# Patient Record
Sex: MALE | Race: WHITE | NOT HISPANIC OR LATINO | URBAN - METROPOLITAN AREA
[De-identification: names, ages, dates, MRNs, and addresses within clinical notes are randomized per-mention and may not be internally consistent; named-entity substitution may affect disease eponyms.]

---

## 2024-11-15 ENCOUNTER — TELEPHONE (OUTPATIENT)
Dept: NEUROSURGERY | Facility: CLINIC | Age: 59
End: 2024-11-15
Payer: COMMERCIAL

## 2024-12-12 ENCOUNTER — OFFICE VISIT (OUTPATIENT)
Dept: NEUROSURGERY | Facility: HOSPITAL | Age: 59
End: 2024-12-12
Payer: COMMERCIAL

## 2024-12-12 VITALS
BODY MASS INDEX: 31.44 KG/M2 | HEART RATE: 53 BPM | WEIGHT: 245 LBS | RESPIRATION RATE: 18 BRPM | SYSTOLIC BLOOD PRESSURE: 131 MMHG | DIASTOLIC BLOOD PRESSURE: 70 MMHG | HEIGHT: 74 IN

## 2024-12-12 DIAGNOSIS — M48.07 FORAMINAL STENOSIS OF LUMBOSACRAL REGION: ICD-10-CM

## 2024-12-12 DIAGNOSIS — M79.605 LOW BACK PAIN RADIATING TO LEFT LOWER EXTREMITY: Primary | ICD-10-CM

## 2024-12-12 DIAGNOSIS — M54.50 LOW BACK PAIN RADIATING TO LEFT LOWER EXTREMITY: Primary | ICD-10-CM

## 2024-12-12 PROCEDURE — 99215 OFFICE O/P EST HI 40 MIN: CPT | Performed by: STUDENT IN AN ORGANIZED HEALTH CARE EDUCATION/TRAINING PROGRAM

## 2024-12-12 PROCEDURE — 99205 OFFICE O/P NEW HI 60 MIN: CPT | Performed by: STUDENT IN AN ORGANIZED HEALTH CARE EDUCATION/TRAINING PROGRAM

## 2024-12-12 PROCEDURE — 3008F BODY MASS INDEX DOCD: CPT | Performed by: STUDENT IN AN ORGANIZED HEALTH CARE EDUCATION/TRAINING PROGRAM

## 2024-12-12 RX ORDER — ATORVASTATIN CALCIUM 20 MG/1
0.5 TABLET, FILM COATED ORAL
COMMUNITY
Start: 2024-04-30

## 2024-12-12 RX ORDER — METOPROLOL TARTRATE 50 MG/1
50 TABLET ORAL 2 TIMES DAILY
COMMUNITY
Start: 2024-10-28

## 2024-12-12 RX ORDER — LEVOTHYROXINE SODIUM 88 UG/1
88 TABLET ORAL
COMMUNITY
Start: 2024-06-11 | End: 2025-03-08

## 2024-12-12 RX ORDER — ASPIRIN 81 MG/1
81 TABLET ORAL DAILY
COMMUNITY

## 2024-12-12 RX ORDER — GABAPENTIN 300 MG/1
300 CAPSULE ORAL 2 TIMES DAILY
COMMUNITY

## 2024-12-12 RX ORDER — LANOLIN ALCOHOL/MO/W.PET/CERES
400 CREAM (GRAM) TOPICAL
COMMUNITY
Start: 2023-01-09

## 2024-12-12 RX ORDER — BACLOFEN 5 MG/1
1 TABLET ORAL DAILY PRN
COMMUNITY
Start: 2024-12-04 | End: 2025-06-02

## 2024-12-12 RX ORDER — DILTIAZEM HYDROCHLORIDE 240 MG/1
240 CAPSULE, COATED, EXTENDED RELEASE ORAL
COMMUNITY
Start: 2024-05-09 | End: 2025-05-04

## 2024-12-12 RX ORDER — MELOXICAM 15 MG/1
15 TABLET ORAL
COMMUNITY
Start: 2023-12-01

## 2024-12-12 RX ORDER — POLYETHYLENE GLYCOL 3350 17 G/17G
17 POWDER, FOR SOLUTION ORAL
COMMUNITY

## 2024-12-12 RX ORDER — PROMETHAZINE HYDROCHLORIDE 25 MG/1
25 TABLET ORAL AS NEEDED
COMMUNITY
Start: 2023-04-26

## 2024-12-12 ASSESSMENT — PAIN SCALES - GENERAL: PAINLEVEL_OUTOF10: 7

## 2024-12-12 NOTE — PROGRESS NOTES
[facility] [place]  Department of Neurological Surgery  New Patient Visit    History of Present Illness:  Wilfred Lubin is a 59 y.o. year old male who presents to the spine clinic with lower back and left leg radiculopathy.      Wilfred has had 10 years of debilitating back pain following a trauma. He has been seeing pain management for many years with many injections. He has had multiple surgeries on his left foot ultimately resulting in a fused ankle. He had a LEFT hip replacement in 5/2022 (Jimi, [facility]). Pain is worse with sitting, but also with standing and improved with walking. Pain is not improved with leaning forward. He endorses lower back pain that radiates into his buttock and posterior thigh into his whole leg.     Whole distal leg He is taking gabapentin: 600-300-900. He has trialed baclofen in the past.  Recently started on tramadol by his pain team. Last injection was Left TFESI L4-5, L5-S1 on 9/9/24 (Kathy, [facility]). An injection in 5/2024 brought 2 months of relief and previously would last 8-9 months. He stated that the injection does better when the procedure is more painful. He has an abnormal gait and forced to walk more sideways.     He has been followed by Dr. Ash ([facility]). He has also seen peripheral nerve surgeon Dr. Terry ([facility]) who felt there was no concern for neuropathy.  He has had 2 EMGs.      PMH: CAD s/p PTCA, paroxysmal A-fib, dyslipidemia, hypothyroidism, and HTN   ASA81    Prior Spine Surgeries: no , Left THR    Physical Therapy: mutliple times,  last few months ago   Diabetic: no    Patient's BMI is Body mass index is 31.46 kg/m².    14/14 systems reviewed and negative other than what is listed in the history of present illness    There is no problem list on file for this patient.    No past medical history on file.  No past surgical history on file.  Social History     Tobacco Use    Smoking status: Never     Passive exposure: Never    Smokeless tobacco: Never    Substance Use Topics    Alcohol use: Not Currently     family history is not on file.    Current Outpatient Medications:     aspirin 81 mg EC tablet, Take 1 tablet (81 mg) by mouth once daily., Disp: , Rfl:     atorvastatin (Lipitor) 20 mg tablet, Take 0.5 tablets (10 mg) by mouth early in the morning.., Disp: , Rfl:     baclofen (Lioresal) 5 mg tablet, Take 1 tablet (5 mg) by mouth once daily as needed., Disp: , Rfl:     dilTIAZem CD (Cardizem CD) 240 mg 24 hr capsule, Take 1 capsule (240 mg) by mouth once daily., Disp: , Rfl:     gabapentin (Neurontin) 300 mg capsule, Take 1 capsule (300 mg) by mouth 2 times a day., Disp: , Rfl:     levothyroxine (Synthroid, Levoxyl) 88 mcg tablet, Take 1 tablet (88 mcg) by mouth once daily., Disp: , Rfl:     magnesium oxide (Mag-Ox) 400 mg (241.3 mg magnesium) tablet, Take 1 tablet (400 mg) by mouth once daily., Disp: , Rfl:     meloxicam (Mobic) 15 mg tablet, Take 1 tablet (15 mg) by mouth once daily., Disp: , Rfl:     metoprolol tartrate (Lopressor) 50 mg tablet, Take 1 tablet by mouth twice a day., Disp: , Rfl:     polyethylene glycol (Glycolax, Miralax) 17 gram/dose powder, Mix 17 g of powder and drink once daily., Disp: , Rfl:     promethazine (Phenergan) 25 mg tablet, Take 1 tablet (25 mg) by mouth if needed for nausea., Disp: , Rfl:   No Known Allergies    Physical Examination    General: Well developed, awake/alert/oriented x3, no distress, alert and cooperative  Skin: Warm and dry, no lesions, no rashes  ENMT: Mucous membranes moist, no apparent injury, no lesions seen  Head/Neck: Neck Supple, no apparent injury  Respiratory/Thorax: Normal breath sounds with good chest expansion, thorax symmetric  Cardiovascular: No pitting edema, no JVD    Motor Strength: 5/5 Throughout all extremities   Left ankle fusion     Muscle Bulk: Normal and symmetric in all extremities    Sensation: intact to light touch    Results    I personally reviewed and interpreted the imaging results  which included     MRI 6/5/24: Endplate changes at L5-S1 and severe Left foraminal stenosis.  Loss of disc height on the left side.           EMG([facility]) 10/29/24  Extensive electrodiagnostic examination of the left lower extremity and additional more limited studies of the right reveals the following findings:  1. Chronic motor axon loss changes consistent with intraspinal canal lesions affecting bilateral L5-S1 roots or segments (i.e, motor  radiculopathy), mild in degree electrically, without being accompanied by evidence of active or ongoing motor fiber loss. The findings are similar  to those noted on the prior EMG of the left lower extremity, 7/8/2021.  2. Left peroneal compound motor action potentials recording at the extensor digitorum brevis (EDB) muscle is absent. Chronic motor unit  potential changes were noted in the muscle on needle examination. This finding is commonly seen due to local compression of the distal deep  peroneal nerve at the ankle by shoe wear. This finding was previously noted on the prior EMG from 7/8//2021.  3. No evidence of a a large fiber sensorimotor polyneuropathy affecting the lower extremity.    Assessment and Plan:    Wilfred Lubin is a 59 y.o. year old male who presents to the spine clinic with lower back and left leg radiculopathy.      He has found some relief with injections, but they have had diminished returns.  He has severe LEFT L5-S1 foraminal stenosis with endplate changes at that level.  His EMG supports pathology at the L5-S1 level.  He has a left ankle fusion, which confounds the exam, but his symptoms and imaging findings and failure of longstanding conservative management makes surgery a reasonable option.  He states he cannot tolerate this pain any further.     I recommend a Robotic, Minimally Invasive LEFT TLIF to both directly and indirectly  decompress the left L5 nerve root, followed by stabilization.  We discussed other surgical approaches.  We discussed  risks of the procedure including failure to resolve symptoms, hematoma, infection, hardware failure, need for future operations, spinal fluid leak.     I would also like to obtain his standing films and CT Lumbar before finalizing the surgical decision.     While there is no evidence of instability in the form of pars defect or spondylolisthesis or prior discectomies; because of the presence of foraminal and extraforaminal severe stenosis, extensive decompression with removal of almost the entire facet in the form of complete facetectomy /resection of pars interarticularis or more than 75% of the facet will have to be performed at the operative levels that will result in destabilization of the spine/intraoperative spinal instability and hence would require concomitant stabilization by placement of pedicle screws. I believe that not performing a fusion and instrumentation following the extensive decompression will be a suboptimal treatment and leave the spine destabilized with potential worsening of her symptoms and development of spinal deformity that may require a much bigger revision surgery that currently planned.     I have reviewed all prior documentation and reviewed the electronic medical record since admission. I have personally have reviewed all advanced imaging not just the reports and used my interpretation as documented as the relevant findings. I have reviewed the risks and benefits of all treatment recommendations listed in this note with the patient and family.     The above clinical summary has been dictated with voice recognition software. It has not been proofread for grammatical errors, typographical mistakes, or other semantic inconsistencies.    Thank you for visiting our office today. It was our pleasure to take part in your healthcare.     Do not hesitate to call with any questions regarding your plan of care after leaving at [phone] M-F 8am-4pm.     To clinicians, thank you very much  for this kind referral. It is a privilege to partner with you in the care of your patients. My office would be delighted to assist you with any further consultations or with questions regarding the plan of care outlined. Do not hesitate to call the office or contact me directly.       Sincerely,      Gallo Aldana MD, PhD  Attending Neurosurgeon, [facility] [place]   of Neurological Surgery  [employer] [facility] [employer]  Office: [phone]  Fax: [phone]    [facility]  [address] [place] [address]  [address] [address]

## 2024-12-18 DIAGNOSIS — M48.07 FORAMINAL STENOSIS OF LUMBOSACRAL REGION: ICD-10-CM

## 2024-12-18 DIAGNOSIS — M54.50 LOW BACK PAIN RADIATING TO LEFT LOWER EXTREMITY: Primary | ICD-10-CM

## 2024-12-18 DIAGNOSIS — M79.605 LOW BACK PAIN RADIATING TO LEFT LOWER EXTREMITY: Primary | ICD-10-CM

## 2024-12-19 DIAGNOSIS — M54.50 LOW BACK PAIN RADIATING TO LEFT LOWER EXTREMITY: Primary | ICD-10-CM

## 2024-12-19 DIAGNOSIS — M79.605 LOW BACK PAIN RADIATING TO LEFT LOWER EXTREMITY: Primary | ICD-10-CM

## 2024-12-19 DIAGNOSIS — M48.07 FORAMINAL STENOSIS OF LUMBOSACRAL REGION: ICD-10-CM

## 2024-12-19 RX ORDER — ACETAMINOPHEN 325 MG/1
975 TABLET ORAL ONCE
OUTPATIENT
Start: 2024-12-19 | End: 2024-12-19

## 2024-12-19 RX ORDER — TRANEXAMIC ACID 650 MG/1
1300 TABLET ORAL ONCE
OUTPATIENT
Start: 2024-12-19 | End: 2024-12-19

## 2024-12-19 RX ORDER — CELECOXIB 400 MG/1
400 CAPSULE ORAL ONCE
OUTPATIENT
Start: 2024-12-19 | End: 2024-12-19

## 2024-12-23 ENCOUNTER — PRE-ADMISSION TESTING (OUTPATIENT)
Dept: PREADMISSION TESTING | Facility: HOSPITAL | Age: 59
End: 2024-12-23
Payer: COMMERCIAL

## 2024-12-23 ENCOUNTER — HOSPITAL ENCOUNTER (OUTPATIENT)
Dept: RADIOLOGY | Facility: HOSPITAL | Age: 59
Discharge: HOME | End: 2024-12-23
Payer: COMMERCIAL

## 2024-12-23 ENCOUNTER — HOSPITAL ENCOUNTER (OUTPATIENT)
Dept: CARDIOLOGY | Facility: HOSPITAL | Age: 59
Discharge: HOME | End: 2024-12-23
Payer: COMMERCIAL

## 2024-12-23 VITALS
HEIGHT: 74 IN | OXYGEN SATURATION: 98 % | WEIGHT: 249.8 LBS | BODY MASS INDEX: 32.06 KG/M2 | SYSTOLIC BLOOD PRESSURE: 130 MMHG | HEART RATE: 54 BPM | RESPIRATION RATE: 20 BRPM | DIASTOLIC BLOOD PRESSURE: 76 MMHG | TEMPERATURE: 97.7 F

## 2024-12-23 DIAGNOSIS — M79.605 LOW BACK PAIN RADIATING TO LEFT LOWER EXTREMITY: ICD-10-CM

## 2024-12-23 DIAGNOSIS — M48.07 FORAMINAL STENOSIS OF LUMBOSACRAL REGION: ICD-10-CM

## 2024-12-23 DIAGNOSIS — M54.50 LOW BACK PAIN RADIATING TO LEFT LOWER EXTREMITY: ICD-10-CM

## 2024-12-23 DIAGNOSIS — Z41.9 SURGERY, ELECTIVE: Primary | ICD-10-CM

## 2024-12-23 LAB
ABO GROUP (TYPE) IN BLOOD: NORMAL
ANION GAP SERPL CALC-SCNC: 14 MMOL/L (ref 10–20)
ANTIBODY SCREEN: NORMAL
APTT PPP: 29 SECONDS (ref 27–38)
BASOPHILS # BLD AUTO: 0.05 X10*3/UL (ref 0–0.1)
BASOPHILS NFR BLD AUTO: 0.7 %
BUN SERPL-MCNC: 16 MG/DL (ref 6–23)
CALCIUM SERPL-MCNC: 8.7 MG/DL (ref 8.6–10.6)
CHLORIDE SERPL-SCNC: 101 MMOL/L (ref 98–107)
CO2 SERPL-SCNC: 28 MMOL/L (ref 21–32)
CREAT SERPL-MCNC: 1 MG/DL (ref 0.5–1.3)
EGFRCR SERPLBLD CKD-EPI 2021: 88 ML/MIN/1.73M*2
EOSINOPHIL # BLD AUTO: 0.36 X10*3/UL (ref 0–0.7)
EOSINOPHIL NFR BLD AUTO: 5.3 %
ERYTHROCYTE [DISTWIDTH] IN BLOOD BY AUTOMATED COUNT: 11.9 % (ref 11.5–14.5)
GLUCOSE SERPL-MCNC: 80 MG/DL (ref 74–99)
HCT VFR BLD AUTO: 40.9 % (ref 41–52)
HGB BLD-MCNC: 13.8 G/DL (ref 13.5–17.5)
IMM GRANULOCYTES # BLD AUTO: 0.01 X10*3/UL (ref 0–0.7)
IMM GRANULOCYTES NFR BLD AUTO: 0.1 % (ref 0–0.9)
INR PPP: 1 (ref 0.9–1.1)
LYMPHOCYTES # BLD AUTO: 2.07 X10*3/UL (ref 1.2–4.8)
LYMPHOCYTES NFR BLD AUTO: 30.3 %
MCH RBC QN AUTO: 31.1 PG (ref 26–34)
MCHC RBC AUTO-ENTMCNC: 33.7 G/DL (ref 32–36)
MCV RBC AUTO: 92 FL (ref 80–100)
MONOCYTES # BLD AUTO: 0.73 X10*3/UL (ref 0.1–1)
MONOCYTES NFR BLD AUTO: 10.7 %
NEUTROPHILS # BLD AUTO: 3.61 X10*3/UL (ref 1.2–7.7)
NEUTROPHILS NFR BLD AUTO: 52.9 %
NRBC BLD-RTO: 0 /100 WBCS (ref 0–0)
PLATELET # BLD AUTO: 160 X10*3/UL (ref 150–450)
POTASSIUM SERPL-SCNC: 3.8 MMOL/L (ref 3.5–5.3)
PREALB SERPL-MCNC: 29.7 MG/DL (ref 18–40)
PROTHROMBIN TIME: 10.8 SECONDS (ref 9.8–12.8)
RBC # BLD AUTO: 4.44 X10*6/UL (ref 4.5–5.9)
RH FACTOR (ANTIGEN D): NORMAL
SODIUM SERPL-SCNC: 139 MMOL/L (ref 136–145)
WBC # BLD AUTO: 6.8 X10*3/UL (ref 4.4–11.3)

## 2024-12-23 PROCEDURE — 71046 X-RAY EXAM CHEST 2 VIEWS: CPT

## 2024-12-23 PROCEDURE — 93005 ELECTROCARDIOGRAM TRACING: CPT

## 2024-12-23 PROCEDURE — 99205 OFFICE O/P NEW HI 60 MIN: CPT | Performed by: NURSE ANESTHETIST, CERTIFIED REGISTERED

## 2024-12-23 PROCEDURE — 36415 COLL VENOUS BLD VENIPUNCTURE: CPT

## 2024-12-23 PROCEDURE — 86900 BLOOD TYPING SEROLOGIC ABO: CPT

## 2024-12-23 PROCEDURE — 84134 ASSAY OF PREALBUMIN: CPT

## 2024-12-23 PROCEDURE — 85025 COMPLETE CBC W/AUTO DIFF WBC: CPT

## 2024-12-23 PROCEDURE — 80048 BASIC METABOLIC PNL TOTAL CA: CPT

## 2024-12-23 PROCEDURE — 93010 ELECTROCARDIOGRAM REPORT: CPT | Performed by: INTERNAL MEDICINE

## 2024-12-23 PROCEDURE — 85730 THROMBOPLASTIN TIME PARTIAL: CPT

## 2024-12-23 PROCEDURE — 87081 CULTURE SCREEN ONLY: CPT

## 2024-12-23 RX ORDER — CHLORHEXIDINE GLUCONATE ORAL RINSE 1.2 MG/ML
15 SOLUTION DENTAL DAILY
Qty: 30 ML | Refills: 0 | Status: SHIPPED | OUTPATIENT
Start: 2024-12-23 | End: 2024-12-25

## 2024-12-23 RX ORDER — CHLORHEXIDINE GLUCONATE 40 MG/ML
1 SOLUTION TOPICAL DAILY
Qty: 25 ML | Refills: 0 | Status: SHIPPED | OUTPATIENT
Start: 2024-12-23 | End: 2025-01-05 | Stop reason: HOSPADM

## 2024-12-23 RX ORDER — TRANEXAMIC ACID 650 MG/1
1300 TABLET ORAL ONCE
OUTPATIENT
Start: 2024-12-23 | End: 2024-12-23

## 2024-12-23 RX ORDER — ACETAMINOPHEN 325 MG/1
975 TABLET ORAL ONCE
OUTPATIENT
Start: 2024-12-23 | End: 2024-12-23

## 2024-12-23 RX ORDER — CELECOXIB 400 MG/1
400 CAPSULE ORAL ONCE
OUTPATIENT
Start: 2024-12-23 | End: 2024-12-23

## 2024-12-23 ASSESSMENT — DUKE ACTIVITY SCORE INDEX (DASI)
CAN YOU DO MODERATE WORK AROUND THE HOUSE LIKE VACUUMING, SWEEPING FLOORS OR CARRYING GROCERIES: YES
DASI METS SCORE: 7.3
CAN YOU DO LIGHT WORK AROUND THE HOUSE LIKE DUSTING OR WASHING DISHES: YES
TOTAL_SCORE: 36.7
CAN YOU WALK A BLOCK OR TWO ON LEVEL GROUND: YES
CAN YOU PARTICIPATE IN MODERATE RECREATIONAL ACTIVITIES LIKE GOLF, BOWLING, DANCING, DOUBLES TENNIS OR THROWING A BASEBALL OR FOOTBALL: NO
CAN YOU DO YARD WORK LIKE RAKING LEAVES, WEEDING OR PUSHING A MOWER: YES
CAN YOU CLIMB A FLIGHT OF STAIRS OR WALK UP A HILL: YES
CAN YOU DO HEAVY WORK AROUND THE HOUSE LIKE SCRUBBING FLOORS OR LIFTING AND MOVING HEAVY FURNITURE: YES
CAN YOU RUN A SHORT DISTANCE: NO
CAN YOU PARTICIPATE IN STRENOUS SPORTS LIKE SWIMMING, SINGLES TENNIS, FOOTBALL, BASKETBALL, OR SKIING: NO
CAN YOU WALK INDOORS, SUCH AS AROUND YOUR HOUSE: YES
CAN YOU HAVE SEXUAL RELATIONS: YES
CAN YOU TAKE CARE OF YOURSELF (EAT, DRESS, BATHE, OR USE TOILET): YES

## 2024-12-23 ASSESSMENT — ENCOUNTER SYMPTOMS
NUMBNESS: 1
NEUROLOGICAL NEGATIVE: 1
CONSTITUTIONAL NEGATIVE: 1
RESPIRATORY NEGATIVE: 1
EYES NEGATIVE: 1
ENDOCRINE NEGATIVE: 1
NECK NEGATIVE: 1
CARDIOVASCULAR NEGATIVE: 1

## 2024-12-23 NOTE — H&P (VIEW-ONLY)
CPM/PAT Evaluation       Name: Wilfred Lubin (Wilfred Lubin)  /Age: 1965/59 y.o.     In-Person       Chief Complaint: 59 y.o.  male  scheduled for robotic L4-5 minimally invasive transforaminal lumbar interbody fusion on 1/3 with Dr. Aldana.      HPI    Past Medical History:   Diagnosis Date    Arrhythmia     Chronic pain disorder     Coronary artery disease     Hypertension     Myocardial infarction (Multi)        No past surgical history on file.    Patient  has no history on file for sexual activity.    No family history on file.    No Known Allergies    Prior to Admission medications    Medication Sig Start Date End Date Taking? Authorizing Provider   aspirin 81 mg EC tablet Take 1 tablet (81 mg) by mouth once daily.   Yes Historical Provider, MD   atorvastatin (Lipitor) 20 mg tablet Take 0.5 tablets (10 mg) by mouth early in the morning.. 24  Yes Historical Provider, MD   baclofen (Lioresal) 5 mg tablet Take 1 tablet (5 mg) by mouth once daily as needed. 24 Yes Historical Provider, MD   dilTIAZem CD (Cardizem CD) 240 mg 24 hr capsule Take 1 capsule (240 mg) by mouth once daily. 24 Yes Historical Provider, MD   gabapentin (Neurontin) 300 mg capsule Take 1 capsule (300 mg) by mouth 2 times a day.   Yes Historical Provider, MD   levothyroxine (Synthroid, Levoxyl) 88 mcg tablet Take 1 tablet (88 mcg) by mouth once daily. 6/11/24 3/8/25 Yes Historical Provider, MD   magnesium oxide (Mag-Ox) 400 mg (241.3 mg magnesium) tablet Take 1 tablet (400 mg) by mouth once daily. 23  Yes Historical Provider, MD   meloxicam (Mobic) 15 mg tablet Take 1 tablet (15 mg) by mouth once daily. 23  Yes Historical Provider, MD   metoprolol tartrate (Lopressor) 50 mg tablet Take 1 tablet by mouth twice a day. 10/28/24  Yes Historical Provider, MD   polyethylene glycol (Glycolax, Miralax) 17 gram/dose powder Mix 17 g of powder and drink once daily.   Yes Historical Provider, MD  "  promethazine (Phenergan) 25 mg tablet Take 1 tablet (25 mg) by mouth if needed for nausea. 4/26/23  Yes Historical Provider, MD MAIN ROS:   Constitutional:   neg    Neuro/Psych:   neg    Eyes:   neg    Ears:   neg    Nose:   neg    Mouth:   neg    Throat:   neg    Neck:   neg    Cardio:   neg    Respiratory:   neg    Endocrine:   GI:   :   Musculoskeletal:    Left leg pain   Hematologic:   neg    Skin:  neg        Physical Exam  Constitutional:       Appearance: Normal appearance.   HENT:      Head: Normocephalic and atraumatic.      Nose: Nose normal.   Eyes:      Pupils: Pupils are equal, round, and reactive to light.   Cardiovascular:      Rate and Rhythm: Normal rate and regular rhythm.      Pulses: Normal pulses.   Pulmonary:      Effort: Pulmonary effort is normal.      Breath sounds: Normal breath sounds.   Abdominal:      Palpations: Abdomen is soft.   Musculoskeletal:      Cervical back: Normal range of motion.      Comments: Fused ankle   Skin:     General: Skin is warm and dry.   Neurological:      General: No focal deficit present.      Mental Status: He is alert.   Psychiatric:         Mood and Affect: Mood normal.          PAT AIRWAY:   Airway:     Mallampati::  I    TM distance::  >3 FB    Neck ROM::  Full   upper dentures and lower dentures      Testing/Diagnostic:     Patient Specialist/PCP:   Cardiologist  Renaldo Benavides    Visit Vitals  /76   Pulse 54   Temp 36.5 °C (97.7 °F) (Oral)   Resp 20   Ht 1.88 m (6' 2\")   Wt 113 kg (249 lb 12.8 oz)   SpO2 98%   BMI 32.07 kg/m²   Smoking Status Never   BSA 2.43 m²       DASI Risk Score      Flowsheet Row Pre-Admission Testing from 12/23/2024 in [facility]   Can you take care of yourself (eat, dress, bathe, or use toilet)?  2.75 filed at 12/23/2024 0747   Can you walk indoors, such as around your house? 1.75 filed at 12/23/2024 0747   Can you walk a block or two on level ground?  2.75 filed at 12/23/2024 0747   Can you " climb a flight of stairs or walk up a hill? 5.5 filed at 12/23/2024 0747   Can you run a short distance? 0 filed at 12/23/2024 0747   Can you do light work around the house like dusting or washing dishes? 2.7 filed at 12/23/2024 0747   Can you do moderate work around the house like vacuuming, sweeping floors or carrying groceries? 3.5 filed at 12/23/2024 0747   Can you do heavy work around the house like scrubbing floors or lifting and moving heavy furniture?  8 filed at 12/23/2024 0747   Can you do yard work like raking leaves, weeding or pushing a mower? 4.5 filed at 12/23/2024 0747   Can you have sexual relations? 5.25 filed at 12/23/2024 0747   Can you participate in moderate recreational activities like golf, bowling, dancing, doubles tennis or throwing a baseball or football? 0 filed at 12/23/2024 0747   Can you participate in strenous sports like swimming, singles tennis, football, basketball, or skiing? 0 filed at 12/23/2024 0747   DASI SCORE 36.7 filed at 12/23/2024 0747   METS Score (Will be calculated only when all the questions are answered) 7.3 filed at 12/23/2024 0747          Caprini DVT Assessment    No data to display       Modified Frailty Index    No data to display       CHADS2 Stroke Risk  Current as of 12 minutes ago        N/A 3 to 100%: High Risk   2 to < 3%: Medium Risk   0 to < 2%: Low Risk     Last Change: N/A          This score determines the patient's risk of having a stroke if the patient has atrial fibrillation.        This score is not applicable to this patient. Components are not calculated.          Revised Cardiac Risk Index    No data to display       Apfel Simplified Score    No data to display       Risk Analysis Index Results This Encounter    No data found in the last 10 encounters.       Stop Bang Score      Flowsheet Row Pre-Admission Testing from 12/23/2024 in [facility]   Do you snore loudly? 0 filed at 12/23/2024 0747   Do you often feel tired or  fatigued after your sleep? 0 filed at 12/23/2024 0747   Has anyone ever observed you stop breathing in your sleep? 0 filed at 12/23/2024 0747   Do you have or are you being treated for high blood pressure? 1 filed at 12/23/2024 07   Recent BMI (Calculated) 31.4 filed at 12/23/2024 07   Is BMI greater than 35 kg/m2? 0=No filed at 12/23/2024 07   Age older than 50 years old? 1=Yes filed at 12/23/2024 07   Is your neck circumference greater than 17 inches (Male) or 16 inches (Female)? 1 filed at 12/23/2024 07   Gender - Male 1=Yes filed at 12/23/2024 07   STOP-BANG Total Score 4 filed at 12/23/2024 07          Prodigy: High Risk  Total Score: 8              Prodigy Gender Score          ARISCAT Score for Postoperative Pulmonary Complications    No data to display       [place] Perioperative Risk for Myocardial Infarction or Cardiac Arrest (MIGUEL)    No data to display         Assessment and Plan:   Results for orders placed or performed in visit on 12/23/24 (from the past 24 hours)   Basic Metabolic Panel   Result Value Ref Range    Glucose 80 74 - 99 mg/dL    Sodium 139 136 - 145 mmol/L    Potassium 3.8 3.5 - 5.3 mmol/L    Chloride 101 98 - 107 mmol/L    Bicarbonate 28 21 - 32 mmol/L    Anion Gap 14 10 - 20 mmol/L    Urea Nitrogen 16 6 - 23 mg/dL    Creatinine 1.00 0.50 - 1.30 mg/dL    eGFR 88 >60 mL/min/1.73m*2    Calcium 8.7 8.6 - 10.6 mg/dL   Prealbumin   Result Value Ref Range    Prealbumin 29.7 18.0 - 40.0 mg/dL   CBC and Auto Differential   Result Value Ref Range    WBC 6.8 4.4 - 11.3 x10*3/uL    nRBC 0.0 0.0 - 0.0 /100 WBCs    RBC 4.44 (L) 4.50 - 5.90 x10*6/uL    Hemoglobin 13.8 13.5 - 17.5 g/dL    Hematocrit 40.9 (L) 41.0 - 52.0 %    MCV 92 80 - 100 fL    MCH 31.1 26.0 - 34.0 pg    MCHC 33.7 32.0 - 36.0 g/dL    RDW 11.9 11.5 - 14.5 %    Platelets 160 150 - 450 x10*3/uL    Neutrophils % 52.9 40.0 - 80.0 %    Immature Granulocytes %, Automated 0.1 0.0 - 0.9 %    Lymphocytes % 30.3 13.0 - 44.0 %     Monocytes % 10.7 2.0 - 10.0 %    Eosinophils % 5.3 0.0 - 6.0 %    Basophils % 0.7 0.0 - 2.0 %    Neutrophils Absolute 3.61 1.20 - 7.70 x10*3/uL    Immature Granulocytes Absolute, Automated 0.01 0.00 - 0.70 x10*3/uL    Lymphocytes Absolute 2.07 1.20 - 4.80 x10*3/uL    Monocytes Absolute 0.73 0.10 - 1.00 x10*3/uL    Eosinophils Absolute 0.36 0.00 - 0.70 x10*3/uL    Basophils Absolute 0.05 0.00 - 0.10 x10*3/uL   Coagulation Screen   Result Value Ref Range    Protime 10.8 9.8 - 12.8 seconds    INR 1.0 0.9 - 1.1    aPTT 29 27 - 38 seconds   Type And Screen   Result Value Ref Range    ABO TYPE A     Rh TYPE POS     ANTIBODY SCREEN NEG         Assessment and Plan:    59 y.o.  male  scheduled for robotic L4-5 minimally invasive transforaminal lumbar interbody fusion on 1/3 with Dr. Aldana.  Presents to [facility] today for perioperative risk stratification and optimization    Neuro:  No neurologic diagnosis or significant findings on chart review, clinical presentation and evaluation.  No grossly apparent neurologic perioperative risk.    Patient is at increased risk for perioperative CVA secondary to  cardiac disease, HTN    HEENT:  No HEENT diagnosis or significant findings on chart review or clinical presentation and evaluation. No further preoperative testing/intervention indicated at this time.    Cardiovascular:  Coronary artery disease involving native coronary artery of native heart without angina pectoris  - S/p PCI to LCX in setting of AMI, VF arrest 6/2019  - Stable  - Stress SPECT 3/2024: basal inferolateral and lateral infarct, EF 62%  - Echo 3/2024: LVEF 60-65%, mod LAE  - Continue ASA, high intensity statin  - Continue metoprolol, diltiazem     Essential hypertension, controlled     Peripheral arterial disease (HCC)  - Dissection LCIA, ectatic vessel measuring 1.8cm  - Followed by vascular surgery    Hyperlipidemia     METS: 7.3  RCRI: 1 point, 6.0% risk for postoperative MACE   MIGUEL: 0.1% risk for 30 day  postoperative MACE      Pulmonary:  s/p trach when he had his cardiac arrest  No pulmonary diagnosis or significant findings on chart review or clinical presentation.  No further preoperative testing is indicated at this time.    Stop Bang score is 4 placing patient at intermediate isk for ANITA  ARISCAT: <26 points, 1.6% risk of in-hospital postoperative pulmonary complication  PRODIGY: Moderate risk for opioid induced respiratory depression      Renal:   No renal diagnosis or significant findings on chart review, clinical presentation or evaluation. No further preoperative testing/intervention is indicated at this time.  Age equal to or greater than 56, BMI equal to or greater than 30, HTN   risk for perioperative PINKY based on Dynamic Predictive Scoring Tool for Perioperative PINKY     Endocrine: Hypothyroid    Hematologic:     No hematologic diagnosis or significant findings on chart review or clinical presentation and evaluation. No further testing or intervention is indicated at this time.   Caprini Score 4, patient at Moderate risk for perioperative DVT.  Patient provided with VTE education/handout.    Gastrointestinal:  Gastroparesis, nausea     Infectious disease:   No infectious diagnosis or significant findings on chart review or clinical presentation and evaluation.   Prescription provided for CHG body wash and dental rinse. CHG use instructions reviewed and provided to patient.  Staph screen collected    Musculoskeletal:   No diagnosis or significant findings on chart review or clinical presentation and evaluation.     Anesthesia/Airway:  No anesthesia complications     PAT Evaluation      Airway   Mallampati: I  TM distance: >3 FB  Neck ROM: full  Dental   (+) upper dentures, lower dentures    Pulmonary - negative  Cardiovascular   (+) hypertension, past MI, CAD    Neuro/Psych - negative  GI/Hepatic/Renal - negative    Endo/Other - negative               Medication instructions and NPO guidelines reviewed  with the patient.  All questions or concerns discussed and addressed.

## 2024-12-23 NOTE — PREPROCEDURE INSTRUCTIONS
Medication List            Accurate as of December 23, 2024  7:30 AM. Always use your most recent med list.                aspirin 81 mg EC tablet     atorvastatin 20 mg tablet  Commonly known as: Lipitor     baclofen 5 mg tablet  Commonly known as: Lioresal     dilTIAZem  mg 24 hr capsule  Commonly known as: Cardizem CD     gabapentin 300 mg capsule  Commonly known as: Neurontin     levothyroxine 88 mcg tablet  Commonly known as: Synthroid, Levoxyl     magnesium oxide 400 mg (241.3 mg magnesium) tablet  Commonly known as: Mag-Ox     meloxicam 15 mg tablet  Commonly known as: Mobic     metoprolol tartrate 50 mg tablet  Commonly known as: Lopressor     polyethylene glycol 17 gram/dose powder  Commonly known as: Glycolax, Miralax     promethazine 25 mg tablet  Commonly known as: Phenergan                              NPO Instructions:    Do not eat any food after midnight the night before your surgery/procedure.  You may have clear liquids until TWO hours before surgery/procedure. This includes water, black tea/coffee, (no milk or cream) apple juice and electrolyte drinks (Gatorade).    Additional Instructions:     Thank you for visiting [facility] today for your pre-procedure evaluation, you were seen by Pham Randle CRNA      This summary includes instructions and information to aid you during your perioperative period.  Please read carefully. If you have any questions about your visit today, please call the number listed above.  If you become ill or have any changes to your health before your surgery, please contact your primary care provider and alert your surgeon.    Preparing for your Surgery       Exercises  Preoperative Deep Breathing Exercises  Why it is important to do deep breathing exercises before my surgery?  Deep breathing exercises strengthen your breathing muscles.  This helps you to recover after your surgery and decreases the chance of breathing  complications.  How are the deep breathing exercises done?  Sit straight with your back supported.  Breathe in deeply and slowly through your nose. Your lower rib cage should expand and your abdomen may move forward.  Hold that breath for 3 to 5 seconds.  Breathe out through pursed lips, slowly and completely.  Rest and repeat 10 times every hour while awake.  Rest longer if you become dizzy or lightheaded.       Incentive Spirometer   You were not provided an incentive spirometer in [facility], please disregard the incentive spirometer instructions  What is an incentive spirometer?  An incentive spirometer is a device used before and after surgery to “exercise” your lungs.  It helps you to take deeper breaths to expand your lungs.  Below is an example of a basic incentive spirometer.  The device you receive may differ slightly but they all function the same.    Why do I need to use an incentive spirometer?  Using your incentive spirometer prepares your lungs for surgery and helps prevent lung problems after surgery.  How do I use my incentive spirometer?  When you're using your incentive spirometer, make sure to breathe through your mouth. If you breathe through your nose, the incentive spirometer won't work properly. You can hold your nose if you have trouble.  If you feel dizzy at any time, stop and rest. Try again at a later time.  Follow the steps below:  Set up your incentive spirometer, expand the flexible tubing and connect to the outlet.  Sit upright in a chair or bed. Hold the incentive spirometer at eye level.   Put the mouthpiece in your mouth and close your lips tightly around it. Slowly breathe out (exhale) completely.  Breathe in (inhale) slowly through your mouth as deeply as you can. As you take a breath, you will see the piston rise inside the large column. While the piston rises, the indicator should move upwards. It should stay in between the 2 arrows (see Figure).  Try to get the piston as high as you  can, while keeping the indicator between the arrows.   If the indicator doesn't stay between the arrows, you're breathing either too fast or too slow.  When you get it as high as you can, hold your breath for 10 seconds, or as long as possible. While you're holding your breath, the piston will slowly fall to the base of the spirometer.  Once the piston reaches the bottom of the spirometer, breathe out slowly through your mouth. Rest for a few seconds.  Repeat 10 times. Try to get the piston to the same level with each breath.  Repeat every hour while awake  You can carefully clean the outside of the mouthpiece with an alcohol wipe or soap and water.      Preoperative Brain Exercises    What are brain exercises?  A brain exercise is any activity that engages your thinking (cognitive) skills.    What types of activities are considered brain exercises?  Jigsaw puzzles, crossword puzzles, word jumble, memory games, word search, and many more.  Many can be found free online or on your phone via a mobile graciela.    Why should I do brain exercises before my surgery?  More recent research has shown brain exercise before surgery can lower the risk of postoperative delirium (confusion) which can be especially important for older adults.  Patients who did brain exercises for 5 to 10 hours the days before surgery, cut their risk of postoperative delirium in half up to 1 week after surgery.    Sit-to-Stand Exercise    What is the sit-to-stand exercise?  The sit-to-stand exercise strengthens the muscles of your lower body and muscles in the center of your body (core muscles for stability) helping to maintain and improve your strength and mobility.  How do I do the sit-to-stand exercise?  The goal is to do this exercise without using your arms or hands.  If this is too difficult, use your arms and hands or a chair with armrests to help slowly push yourself to the standing position and lower yourself back to the sitting position. As  the movement becomes easier use your arms and hands less.    Steps to the sit-to-stand exercise  Sit up tall in a sturdy chair, knees bent, feet flat on the floor shoulder-width apart.  Shift your hips/pelvis forward in the chair to correctly position yourself for the next movement.  Lean forward at your hips.  Stand up straight putting equal weight on both feet.  Check to be sure you are properly aligned with the chair, in a slow controlled movement sit back down.  Repeat this exercise 10-15 times.  If needed you can do it fewer times until your strength improves.  Rest for 1 minute.  Do another 10-15 sit-to-stand exercises.  Try to do this in the morning and evening.        Instructions    Preoperative Fasting Guidelines    Why must I stop eating and drinking near surgery time?  With sedation, food or liquid in your stomach can enter your lungs causing serious complications  Food can increase nausea and vomiting  When do I need to stop eating and drinking before my surgery?      Do not eat any food after midnight the night before your surgery/procedure. You may have up to 13.5 ounces of clear liquid until TWO hours before your instructed arrival time to the hospital.  This includes water, black tea/coffee, (no milk or cream) apple juice, and electrolyte drinks (Gatorade). You may chew gum until TWO hours before your surgery/procedure , Do not eat any food or drink any liquids after midnight the night before your surgery/procedure.  You may have sips of water to take medications.            Simple things you can do to help prevent blood clots     Blood clots are blockages that can form in the body's veins. When a blood clot forms in your deep veins, it may be called a deep vein thrombosis, or DVT for short. Blood clots can happen in any part of the body where blood flows, but they are most common in the arms and legs. If a piece of a blood clot breaks free and travels to the lungs, it is called a pulmonary embolus  (PE). A PE can be a very serious problem.         Being in the hospital or having surgery can raise your chances of getting a blood clot because you may not be well enough to move around as much as you normally do.         Ways you can help prevent blood clots in the hospital       Wearing SCDs  SCDs stands for Sequential Compression Devices.   SCDs are special sleeves that wrap around your legs. They attach to a pump that fills them with air to gently squeeze your legs every few minutes.  This helps return the blood in your legs to your heart.   SCDs should only be taken off when walking or bathing. SCDs may not be comfortable, but they can help save your life.              Pump SCD leg sleeves  Wearing compression stockings - if your doctor orders them. These special snug-fitting stockings gently squeeze your legs to help blood flow.       Walking. Walking helps move the blood in your legs.   If your doctor says it is ok, try walking the halls at least   5 times a day. Ask us to help you get up, so you don't fall.      Taking any blood-thinning medicines your doctor orders.              Ways you can help prevent blood clots at home         Wearing compression stockings - if your doctor orders them.   Walking - to help move the blood in your legs.    Taking any blood-thinning medicines your doctor orders.      Signs of a blood clot or PE    Tell your doctor or nurse right away if you have any of the problems listed below.         If you are at home, seek medical care right away. Call 911 for chest pain or problems breathing.            Signs of a blood clot (DVT) - such as pain, swelling, redness, or warmth in your arm or legs.  Signs of a pulmonary embolism (PE) - such as chest pain or feeling short of breath      Tobacco and Alcohol;  Do not drink alcohol or smoke within 24 hours of surgery.  It is best to quit smoking for as long as possible before any surgery or procedure.         Other Instructions  Why did I  "have my nose, under my arms, and groin swabbed? The purpose of the swab is to identify Staphylococcus aureus inside your nose or on your skin.  The swab was sent to the laboratory for culture.  A positive swab/culture for Staphylococcus aureus is called colonization or carriage.   What is Staphylococcus aureus? Staphylococcus aureus, also known as \"staph\", is a germ found on the skin or in the nose of healthy people.  Sometimes Staphylococcus aureus can get into the body and cause an infection.  This can be minor (such as pimples, boils, or other skin problems).  It might also be serious (such as a blood infection, pneumonia, or a surgical site infection). What is Staphylococcus aureus colonization or carriage? Colonization or carriage means that a person has the germ but is not sick from it.  These bacteria can be spread on the hands or when breathing or sneezing. How is Staphylococcus aureus spread? It is most often spread by close contact with a person or item that carries it. What happens if my culture is positive for Staphylococcus aureus? Your doctor/medical team will use this information to guide any antibiotic treatment which may be necessary.  Regardless of the culture results, we will clean the inside of your nose with a betadine swab just before you have your surgery. Will I get an infection if I have Staphylococcus aureus in my nose or on my skin? Anyone can get an infection with Staphylococcus aureus.  However, the best way to reduce your risk of infection is to follow the instructions provided to you for the use of your CHG soap and dental rinse.  , Body Wash; What is a home preoperative antibacterial shower? This shower is a way of cleaning the skin with a germ-killing solution before surgery.  The solution contains chlorhexidine, commonly known as CHG.  CHG is a skin cleanser with germ-killing ability.  Let your doctor know if you are allergic to chlorhexidine. Why do I need to take a preoperative " antibacterial shower? Skin is not sterile.  It is best to try to make your skin as free of germs as possible before surgery.  Proper cleansing with a germ-killing soap before surgery can lower the number of germs on your skin.  This helps to reduce the risk of infection at the surgical site.  Following the instructions listed below will help you prepare your skin for surgery.   How do I use the solution? Steps:  Begin using your CHG soap 5 days before your scheduled surgery on ___________.   First, wash and rinse your hair using the CHG soap. Keep CHG soap away from ear canals and eyes.  Rinse completely, do not condition.  Hair extensions should be removed. , Oral/Dental Rinse: What is oral/dental rinse?  It is a mouthwash. It is a way of cleaning the mouth with a germ-killing solution before your surgery.  The solution contains chlorhexidine, commonly known as CHG. It is used inside the mouth to kill a bacteria known as Staphylococcus aureus.  Let your doctor know if you are allergic to Chlorhexidine. Why do I need to use CHG oral/dental rinse? The CHG oral/dental rinse helps to kill a bacteria in your mouth known as Staphylococcus aureus.  This reduces the risk of infection at the surgical site.  Using your CHG oral/dental rinse STEPS: Use your CHG oral/dental rinse after you brush your teeth the night before (at bedtime) and the morning of your surgery.  Follow all directions on your prescription label.  Use the cap on the container to measure 15 ml.  Swish (gargle if you can) the mouthwash in your mouth for at least 30 seconds, (do not swallow) and spit out.  After you use your CHG rinse, do not rinse your mouth with water, drink or eat.  Please refer to the prescription label for the appropriate time to resume oral intake What side effects might I have using the CHG oral/dental rinse? CHG rinse will stick to plaque on the teeth.  Brush and floss just before use.  Teeth brushing will help avoid staining of  plaque during use.         The Week before Surgery        Seven days before Surgery  Check your [facility] medication instructions  Do the exercises provided to you by [facility]   Arrange for a responsible, adult licensed  to take you home after surgery and stay with you for 24 hours.  You will not be permitted to drive yourself home if you have received any anesthetic/sedation  Six days before surgery  Check your [facility] medication instructions  Do the exercises provided to you by [facility]   Start using Chlorhexidene (CHG) body wash if prescribed  Five days before surgery  Check your [facility] medication instructions  Do the exercises provided to you by [facility]   Continue to use CHG body wash if prescribed  Three days before surgery  Check your [facility] medication instructions  Do the exercises provided to you by [facility]   Continue to use CHG body wash if prescribed  Two days before surgery  Check your [facility] medication instructions  Do the exercises provided to you by [facility]   Continue to use CHG body wash if prescribed    The Day before Surgery       Check your [facility] medication and all other [facility] instructions including when to stop eating and drinking  You will be called with your arrival time for surgery in the late afternoon.  If you do not receive a call please reach out to your surgeon's office.  Do not smoke or drink 24 hours before surgery  Prepare items to bring with you to the hospital  Shower with your chlorhexidine wash if prescribed  Brush your teeth and use your chlorhexidine dental rinse if prescribed    The Day of Surgery       Check your [facility] medication instructions  Ensure you follow the instructions for when to stop eating and drinking  Shower, if prescribed use CHG.  Do not apply any lotions, creams, moisturizers, perfume or deodorant  Brush your teeth and use your CHG dental rinse if prescribed  Wear loose comfortable clothing  Avoid make-up  Remove  jewelry and piercings, consider professional piercing removal with a plastic spacer if  needed  Bring photo ID and Insurance card  Bring an accurate medication list that includes medication dose, frequency and allergies  Bring a copy of your advanced directives (will, health care power of )  Bring any devices and controllers as well as medical devices you have been provided with for surgery (CPAP, slings, braces, etc.)  Dentures, eyeglasses, and contacts will be removed before surgery, please bring cases for contacts or glasses

## 2024-12-23 NOTE — [facility] H&P
[facility]/PAT Evaluation       Name: Wilfred Lubin)  /Age: 1965/59 y.o.     { PAT Visit Type:23975}      Chief Complaint: ***    HPI    Past Medical History:   Diagnosis Date   • Arrhythmia    • Chronic pain disorder    • Coronary artery disease    • Hypertension    • Myocardial infarction (Multi)        No past surgical history on file.    Patient  has no history on file for sexual activity.    No family history on file.    No Known Allergies    Prior to Admission medications    Medication Sig Start Date End Date Taking? Authorizing Provider   aspirin 81 mg EC tablet Take 1 tablet (81 mg) by mouth once daily.    Historical Provider, MD   atorvastatin (Lipitor) 20 mg tablet Take 0.5 tablets (10 mg) by mouth early in the morning.. 24   Historical Provider, MD   baclofen (Lioresal) 5 mg tablet Take 1 tablet (5 mg) by mouth once daily as needed. 24  Historical Provider, MD   dilTIAZem CD (Cardizem CD) 240 mg 24 hr capsule Take 1 capsule (240 mg) by mouth once daily. 24  Historical Provider, MD   gabapentin (Neurontin) 300 mg capsule Take 1 capsule (300 mg) by mouth 2 times a day.    Historical Provider, MD   levothyroxine (Synthroid, Levoxyl) 88 mcg tablet Take 1 tablet (88 mcg) by mouth once daily. 6/11/24 3/8/25  Historical Provider, MD   magnesium oxide (Mag-Ox) 400 mg (241.3 mg magnesium) tablet Take 1 tablet (400 mg) by mouth once daily. 23   Historical Provider, MD   meloxicam (Mobic) 15 mg tablet Take 1 tablet (15 mg) by mouth once daily. 23   Historical Provider, MD   metoprolol tartrate (Lopressor) 50 mg tablet Take 1 tablet by mouth twice a day. 10/28/24   Historical Provider, MD   polyethylene glycol (Glycolax, Miralax) 17 gram/dose powder Mix 17 g of powder and drink once daily.    Historical Provider, MD   promethazine (Phenergan) 25 mg tablet Take 1 tablet (25 mg) by mouth if needed for nausea. 23   Historical Provider, MD MAIN ROS:    Constitutional:   neg    Neuro/Psych:    Left leg, pain, tingling, numbness   numbness  Eyes:   neg    Ears:   neg    Nose:   neg    Mouth:   neg    Throat:   neg    Neck:   neg    Cardio:    MI 2019, stent x1.     Respiratory:   neg    Endocrine:   neg    GI:    Gastroparesis which causes him to feel nauseous, bloated  :   neg    Musculoskeletal:    S/p crush injury to left lower leg now fused  Hematologic:   neg    Skin:  neg      Physical Exam  Neurological:      Mental Status: He is alert.        PAT AIRWAY:   Airway:     Mallampati::  I    TM distance::  >3 FB    Neck ROM::  Full   upper dentures and lower dentures    Testing/Diagnostic:     Patient Specialist/PCP:     Visit Vitals  Smoking Status Never       DASI Risk Score    No data to display       Caprini DVT Assessment    No data to display       Modified Frailty Index    No data to display       CHADS2 Stroke Risk  Current as of 6 hours ago        N/A 3 to 100%: High Risk   2 to < 3%: Medium Risk   0 to < 2%: Low Risk     Last Change: N/A          This score determines the patient's risk of having a stroke if the patient has atrial fibrillation.        This score is not applicable to this patient. Components are not calculated.          Revised Cardiac Risk Index    No data to display       Apfel Simplified Score    No data to display       Risk Analysis Index Results This Encounter    No data found in the last 10 encounters.       Prodigy: High Risk  Total Score: 8              Prodigy Gender Score          ARISCAT Score for Postoperative Pulmonary Complications    No data to display       [place] Perioperative Risk for Myocardial Infarction or Cardiac Arrest (MIGUEL)    No data to display         Assessment and Plan:     {[facility] EMBEDDED ASSESSMENT AND PLAN:62183}

## 2024-12-23 NOTE — CPM/PAT H&P
CPM/PAT Evaluation       Name: Wilfred Lubin (Wilfred Lubin)  /Age: 1965/59 y.o.     In-Person       Chief Complaint: 59 y.o.  male  scheduled for robotic L4-5 minimally invasive transforaminal lumbar interbody fusion on 1/3 with Dr. Aldana.      HPI    Past Medical History:   Diagnosis Date    Arrhythmia     Chronic pain disorder     Coronary artery disease     Hypertension     Myocardial infarction (Multi)        No past surgical history on file.    Patient  has no history on file for sexual activity.    No family history on file.    No Known Allergies    Prior to Admission medications    Medication Sig Start Date End Date Taking? Authorizing Provider   aspirin 81 mg EC tablet Take 1 tablet (81 mg) by mouth once daily.   Yes Historical Provider, MD   atorvastatin (Lipitor) 20 mg tablet Take 0.5 tablets (10 mg) by mouth early in the morning.. 24  Yes Historical Provider, MD   baclofen (Lioresal) 5 mg tablet Take 1 tablet (5 mg) by mouth once daily as needed. 24 Yes Historical Provider, MD   dilTIAZem CD (Cardizem CD) 240 mg 24 hr capsule Take 1 capsule (240 mg) by mouth once daily. 24 Yes Historical Provider, MD   gabapentin (Neurontin) 300 mg capsule Take 1 capsule (300 mg) by mouth 2 times a day.   Yes Historical Provider, MD   levothyroxine (Synthroid, Levoxyl) 88 mcg tablet Take 1 tablet (88 mcg) by mouth once daily. 6/11/24 3/8/25 Yes Historical Provider, MD   magnesium oxide (Mag-Ox) 400 mg (241.3 mg magnesium) tablet Take 1 tablet (400 mg) by mouth once daily. 23  Yes Historical Provider, MD   meloxicam (Mobic) 15 mg tablet Take 1 tablet (15 mg) by mouth once daily. 23  Yes Historical Provider, MD   metoprolol tartrate (Lopressor) 50 mg tablet Take 1 tablet by mouth twice a day. 10/28/24  Yes Historical Provider, MD   polyethylene glycol (Glycolax, Miralax) 17 gram/dose powder Mix 17 g of powder and drink once daily.   Yes Historical Provider, MD  "  promethazine (Phenergan) 25 mg tablet Take 1 tablet (25 mg) by mouth if needed for nausea. 4/26/23  Yes Historical Provider, MD MAIN ROS:   Constitutional:   neg    Neuro/Psych:   neg    Eyes:   neg    Ears:   neg    Nose:   neg    Mouth:   neg    Throat:   neg    Neck:   neg    Cardio:   neg    Respiratory:   neg    Endocrine:   GI:   :   Musculoskeletal:    Left leg pain   Hematologic:   neg    Skin:  neg        Physical Exam  Constitutional:       Appearance: Normal appearance.   HENT:      Head: Normocephalic and atraumatic.      Nose: Nose normal.   Eyes:      Pupils: Pupils are equal, round, and reactive to light.   Cardiovascular:      Rate and Rhythm: Normal rate and regular rhythm.      Pulses: Normal pulses.   Pulmonary:      Effort: Pulmonary effort is normal.      Breath sounds: Normal breath sounds.   Abdominal:      Palpations: Abdomen is soft.   Musculoskeletal:      Cervical back: Normal range of motion.      Comments: Fused ankle   Skin:     General: Skin is warm and dry.   Neurological:      General: No focal deficit present.      Mental Status: He is alert.   Psychiatric:         Mood and Affect: Mood normal.          PAT AIRWAY:   Airway:     Mallampati::  I    TM distance::  >3 FB    Neck ROM::  Full   upper dentures and lower dentures      Testing/Diagnostic:     Patient Specialist/PCP:   Cardiologist  Renaldo Benavides    Visit Vitals  /76   Pulse 54   Temp 36.5 °C (97.7 °F) (Oral)   Resp 20   Ht 1.88 m (6' 2\")   Wt 113 kg (249 lb 12.8 oz)   SpO2 98%   BMI 32.07 kg/m²   Smoking Status Never   BSA 2.43 m²       DASI Risk Score      Flowsheet Row Pre-Admission Testing from 12/23/2024 in [facility]   Can you take care of yourself (eat, dress, bathe, or use toilet)?  2.75 filed at 12/23/2024 0747   Can you walk indoors, such as around your house? 1.75 filed at 12/23/2024 0747   Can you walk a block or two on level ground?  2.75 filed at 12/23/2024 0747   Can you " climb a flight of stairs or walk up a hill? 5.5 filed at 12/23/2024 0747   Can you run a short distance? 0 filed at 12/23/2024 0747   Can you do light work around the house like dusting or washing dishes? 2.7 filed at 12/23/2024 0747   Can you do moderate work around the house like vacuuming, sweeping floors or carrying groceries? 3.5 filed at 12/23/2024 0747   Can you do heavy work around the house like scrubbing floors or lifting and moving heavy furniture?  8 filed at 12/23/2024 0747   Can you do yard work like raking leaves, weeding or pushing a mower? 4.5 filed at 12/23/2024 0747   Can you have sexual relations? 5.25 filed at 12/23/2024 0747   Can you participate in moderate recreational activities like golf, bowling, dancing, doubles tennis or throwing a baseball or football? 0 filed at 12/23/2024 0747   Can you participate in strenous sports like swimming, singles tennis, football, basketball, or skiing? 0 filed at 12/23/2024 0747   DASI SCORE 36.7 filed at 12/23/2024 0747   METS Score (Will be calculated only when all the questions are answered) 7.3 filed at 12/23/2024 0747          Caprini DVT Assessment    No data to display       Modified Frailty Index    No data to display       CHADS2 Stroke Risk  Current as of 12 minutes ago        N/A 3 to 100%: High Risk   2 to < 3%: Medium Risk   0 to < 2%: Low Risk     Last Change: N/A          This score determines the patient's risk of having a stroke if the patient has atrial fibrillation.        This score is not applicable to this patient. Components are not calculated.          Revised Cardiac Risk Index    No data to display       Apfel Simplified Score    No data to display       Risk Analysis Index Results This Encounter    No data found in the last 10 encounters.       Stop Bang Score      Flowsheet Row Pre-Admission Testing from 12/23/2024 in [facility]   Do you snore loudly? 0 filed at 12/23/2024 0747   Do you often feel tired or  fatigued after your sleep? 0 filed at 12/23/2024 0747   Has anyone ever observed you stop breathing in your sleep? 0 filed at 12/23/2024 0747   Do you have or are you being treated for high blood pressure? 1 filed at 12/23/2024 07   Recent BMI (Calculated) 31.4 filed at 12/23/2024 07   Is BMI greater than 35 kg/m2? 0=No filed at 12/23/2024 07   Age older than 50 years old? 1=Yes filed at 12/23/2024 07   Is your neck circumference greater than 17 inches (Male) or 16 inches (Female)? 1 filed at 12/23/2024 07   Gender - Male 1=Yes filed at 12/23/2024 07   STOP-BANG Total Score 4 filed at 12/23/2024 07          Prodigy: High Risk  Total Score: 8              Prodigy Gender Score          ARISCAT Score for Postoperative Pulmonary Complications    No data to display       [place] Perioperative Risk for Myocardial Infarction or Cardiac Arrest (MIGUEL)    No data to display         Assessment and Plan:   Results for orders placed or performed in visit on 12/23/24 (from the past 24 hours)   Basic Metabolic Panel   Result Value Ref Range    Glucose 80 74 - 99 mg/dL    Sodium 139 136 - 145 mmol/L    Potassium 3.8 3.5 - 5.3 mmol/L    Chloride 101 98 - 107 mmol/L    Bicarbonate 28 21 - 32 mmol/L    Anion Gap 14 10 - 20 mmol/L    Urea Nitrogen 16 6 - 23 mg/dL    Creatinine 1.00 0.50 - 1.30 mg/dL    eGFR 88 >60 mL/min/1.73m*2    Calcium 8.7 8.6 - 10.6 mg/dL   Prealbumin   Result Value Ref Range    Prealbumin 29.7 18.0 - 40.0 mg/dL   CBC and Auto Differential   Result Value Ref Range    WBC 6.8 4.4 - 11.3 x10*3/uL    nRBC 0.0 0.0 - 0.0 /100 WBCs    RBC 4.44 (L) 4.50 - 5.90 x10*6/uL    Hemoglobin 13.8 13.5 - 17.5 g/dL    Hematocrit 40.9 (L) 41.0 - 52.0 %    MCV 92 80 - 100 fL    MCH 31.1 26.0 - 34.0 pg    MCHC 33.7 32.0 - 36.0 g/dL    RDW 11.9 11.5 - 14.5 %    Platelets 160 150 - 450 x10*3/uL    Neutrophils % 52.9 40.0 - 80.0 %    Immature Granulocytes %, Automated 0.1 0.0 - 0.9 %    Lymphocytes % 30.3 13.0 - 44.0 %     Monocytes % 10.7 2.0 - 10.0 %    Eosinophils % 5.3 0.0 - 6.0 %    Basophils % 0.7 0.0 - 2.0 %    Neutrophils Absolute 3.61 1.20 - 7.70 x10*3/uL    Immature Granulocytes Absolute, Automated 0.01 0.00 - 0.70 x10*3/uL    Lymphocytes Absolute 2.07 1.20 - 4.80 x10*3/uL    Monocytes Absolute 0.73 0.10 - 1.00 x10*3/uL    Eosinophils Absolute 0.36 0.00 - 0.70 x10*3/uL    Basophils Absolute 0.05 0.00 - 0.10 x10*3/uL   Coagulation Screen   Result Value Ref Range    Protime 10.8 9.8 - 12.8 seconds    INR 1.0 0.9 - 1.1    aPTT 29 27 - 38 seconds   Type And Screen   Result Value Ref Range    ABO TYPE A     Rh TYPE POS     ANTIBODY SCREEN NEG         Assessment and Plan:    59 y.o.  male  scheduled for robotic L4-5 minimally invasive transforaminal lumbar interbody fusion on 1/3 with Dr. Aldana.  Presents to [facility] today for perioperative risk stratification and optimization    Neuro:  No neurologic diagnosis or significant findings on chart review, clinical presentation and evaluation.  No grossly apparent neurologic perioperative risk.    Patient is at increased risk for perioperative CVA secondary to  cardiac disease, HTN    HEENT:  No HEENT diagnosis or significant findings on chart review or clinical presentation and evaluation. No further preoperative testing/intervention indicated at this time.    Cardiovascular:  Coronary artery disease involving native coronary artery of native heart without angina pectoris  - S/p PCI to LCX in setting of AMI, VF arrest 6/2019  - Stable  - Stress SPECT 3/2024: basal inferolateral and lateral infarct, EF 62%  - Echo 3/2024: LVEF 60-65%, mod LAE  - Continue ASA, high intensity statin  - Continue metoprolol, diltiazem     Essential hypertension, controlled     Peripheral arterial disease (HCC)  - Dissection LCIA, ectatic vessel measuring 1.8cm  - Followed by vascular surgery    Hyperlipidemia     METS: 7.3  RCRI: 1 point, 6.0% risk for postoperative MACE   MIGUEL: 0.1% risk for 30 day  postoperative MACE      Pulmonary:  s/p trach when he had his cardiac arrest  No pulmonary diagnosis or significant findings on chart review or clinical presentation.  No further preoperative testing is indicated at this time.    Stop Bang score is 4 placing patient at intermediate isk for ANITA  ARISCAT: <26 points, 1.6% risk of in-hospital postoperative pulmonary complication  PRODIGY: Moderate risk for opioid induced respiratory depression      Renal:   No renal diagnosis or significant findings on chart review, clinical presentation or evaluation. No further preoperative testing/intervention is indicated at this time.  Age equal to or greater than 56, BMI equal to or greater than 30, HTN   risk for perioperative PINKY based on Dynamic Predictive Scoring Tool for Perioperative PINKY     Endocrine: Hypothyroid    Hematologic:     No hematologic diagnosis or significant findings on chart review or clinical presentation and evaluation. No further testing or intervention is indicated at this time.   Caprini Score 4, patient at Moderate risk for perioperative DVT.  Patient provided with VTE education/handout.    Gastrointestinal:  Gastroparesis, nausea     Infectious disease:   No infectious diagnosis or significant findings on chart review or clinical presentation and evaluation.   Prescription provided for CHG body wash and dental rinse. CHG use instructions reviewed and provided to patient.  Staph screen collected    Musculoskeletal:   No diagnosis or significant findings on chart review or clinical presentation and evaluation.     Anesthesia/Airway:  No anesthesia complications     PAT Evaluation      Airway   Mallampati: I  TM distance: >3 FB  Neck ROM: full  Dental   (+) upper dentures, lower dentures    Pulmonary - negative  Cardiovascular   (+) hypertension, past MI, CAD    Neuro/Psych - negative  GI/Hepatic/Renal - negative    Endo/Other - negative               Medication instructions and NPO guidelines reviewed  with the patient.  All questions or concerns discussed and addressed.

## 2024-12-24 LAB — STAPHYLOCOCCUS SPEC CULT: NORMAL

## 2024-12-30 LAB
ATRIAL RATE: 53 BPM
P AXIS: 66 DEGREES
P OFFSET: 199 MS
P ONSET: 138 MS
PR INTERVAL: 162 MS
Q ONSET: 219 MS
QRS COUNT: 9 BEATS
QRS DURATION: 92 MS
QT INTERVAL: 450 MS
QTC CALCULATION(BAZETT): 418 MS
QTC FREDERICIA: 429 MS
R AXIS: 3 DEGREES
T AXIS: 74 DEGREES
T OFFSET: 444 MS
VENTRICULAR RATE: 52 BPM

## 2024-12-30 PROCEDURE — 93005 ELECTROCARDIOGRAM TRACING: CPT

## 2025-01-02 NOTE — PROGRESS NOTES
Pharmacy Medication History Review    Wilfred Lubin is a 59 y.o. male who is planned to be admitted for No Principal Problem: There is no principal problem currently on the Problem List. Please update the Problem List and refresh.. Pharmacy called the patient prior to their scheduled procedure and reviewed the patient's [id number] medications for accuracy.    Medications ADDED:  none  Medications CHANGED:  Atorvastatin 20mg to atorvastatin 40mg  Gabapentin 300mg directions from #1BID to #2AM,#1afternoon, #3HS  Medications REMOVED:   Promethazine 25mg    Please review updated prior to admission medication list and comments regarding how patient may be taking medications differently by going to Admission tab --> Admission Orders --> Admit Orders / Review prior to admission medications.     Preferred pharmacy, last doses of medications, and allergies to be confirmed with patient by nursing the day of procedure.     Sources used to complete the med history include:  [facility]  Pharmacy dispense history  Patient interview  Chart Review  Care Everywhere     Below are additional concerns with the patient's PTA list.  Patient confirmed they are taking #1/2 tablet of atorvastatin 40mg once daily. L.F. 10/10/24 #45/90d  Patient confirmed they are taking #2 capsules in the morning, #1 capsule in the afternoon, and #3 capsules at bedtime of gabapentin 300mg. L.F. 11/21/24 #540/90d  Patient states they only use miralax ISISN    Barbara Avila    [facility]  Please reach out via Secure Chat for questions

## 2025-01-03 ENCOUNTER — ANESTHESIA EVENT (OUTPATIENT)
Dept: OPERATING ROOM | Facility: HOSPITAL | Age: 60
DRG: 451 | End: 2025-01-03
Payer: COMMERCIAL

## 2025-01-03 ENCOUNTER — APPOINTMENT (OUTPATIENT)
Dept: RADIOLOGY | Facility: HOSPITAL | Age: 60
DRG: 451 | End: 2025-01-03
Payer: COMMERCIAL

## 2025-01-03 ENCOUNTER — ANESTHESIA (OUTPATIENT)
Dept: OPERATING ROOM | Facility: HOSPITAL | Age: 60
DRG: 451 | End: 2025-01-03
Payer: COMMERCIAL

## 2025-01-03 ENCOUNTER — HOSPITAL ENCOUNTER (INPATIENT)
Facility: HOSPITAL | Age: 60
LOS: 2 days | Discharge: HOME | DRG: 451 | End: 2025-01-05
Attending: STUDENT IN AN ORGANIZED HEALTH CARE EDUCATION/TRAINING PROGRAM | Admitting: STUDENT IN AN ORGANIZED HEALTH CARE EDUCATION/TRAINING PROGRAM
Payer: COMMERCIAL

## 2025-01-03 DIAGNOSIS — M54.50 LOW BACK PAIN RADIATING TO LEFT LOWER EXTREMITY: Primary | ICD-10-CM

## 2025-01-03 DIAGNOSIS — M48.07 FORAMINAL STENOSIS OF LUMBOSACRAL REGION: ICD-10-CM

## 2025-01-03 DIAGNOSIS — M79.605 LOW BACK PAIN RADIATING TO LEFT LOWER EXTREMITY: Primary | ICD-10-CM

## 2025-01-03 LAB — GLUCOSE BLD MANUAL STRIP-MCNC: 138 MG/DL (ref 74–99)

## 2025-01-03 PROCEDURE — 2780000003 HC OR 278 NO HCPCS: Performed by: STUDENT IN AN ORGANIZED HEALTH CARE EDUCATION/TRAINING PROGRAM

## 2025-01-03 PROCEDURE — 22633 ARTHRD CMBN 1NTRSPC LUMBAR: CPT | Performed by: STUDENT IN AN ORGANIZED HEALTH CARE EDUCATION/TRAINING PROGRAM

## 2025-01-03 PROCEDURE — 7100000002 HC RECOVERY ROOM TIME - EACH INCREMENTAL 1 MINUTE: Performed by: STUDENT IN AN ORGANIZED HEALTH CARE EDUCATION/TRAINING PROGRAM

## 2025-01-03 PROCEDURE — 7100000001 HC RECOVERY ROOM TIME - INITIAL BASE CHARGE: Performed by: STUDENT IN AN ORGANIZED HEALTH CARE EDUCATION/TRAINING PROGRAM

## 2025-01-03 PROCEDURE — 01NB0ZZ RELEASE LUMBAR NERVE, OPEN APPROACH: ICD-10-PCS | Performed by: STUDENT IN AN ORGANIZED HEALTH CARE EDUCATION/TRAINING PROGRAM

## 2025-01-03 PROCEDURE — 2500000004 HC RX 250 GENERAL PHARMACY W/ HCPCS (ALT 636 FOR OP/ED): Performed by: ANESTHESIOLOGIST ASSISTANT

## 2025-01-03 PROCEDURE — C1889 IMPLANT/INSERT DEVICE, NOC: HCPCS | Performed by: STUDENT IN AN ORGANIZED HEALTH CARE EDUCATION/TRAINING PROGRAM

## 2025-01-03 PROCEDURE — 61783 SCAN PROC SPINAL: CPT | Performed by: STUDENT IN AN ORGANIZED HEALTH CARE EDUCATION/TRAINING PROGRAM

## 2025-01-03 PROCEDURE — 2500000005 HC RX 250 GENERAL PHARMACY W/O HCPCS: Performed by: ANESTHESIOLOGIST ASSISTANT

## 2025-01-03 PROCEDURE — 2500000001 HC RX 250 WO HCPCS SELF ADMINISTERED DRUGS (ALT 637 FOR MEDICARE OP): Performed by: STUDENT IN AN ORGANIZED HEALTH CARE EDUCATION/TRAINING PROGRAM

## 2025-01-03 PROCEDURE — 22840 INSERT SPINE FIXATION DEVICE: CPT | Performed by: STUDENT IN AN ORGANIZED HEALTH CARE EDUCATION/TRAINING PROGRAM

## 2025-01-03 PROCEDURE — 2500000004 HC RX 250 GENERAL PHARMACY W/ HCPCS (ALT 636 FOR OP/ED): Mod: TB | Performed by: STUDENT IN AN ORGANIZED HEALTH CARE EDUCATION/TRAINING PROGRAM

## 2025-01-03 PROCEDURE — C1713 ANCHOR/SCREW BN/BN,TIS/BN: HCPCS | Performed by: STUDENT IN AN ORGANIZED HEALTH CARE EDUCATION/TRAINING PROGRAM

## 2025-01-03 PROCEDURE — 2500000005 HC RX 250 GENERAL PHARMACY W/O HCPCS: Performed by: STUDENT IN AN ORGANIZED HEALTH CARE EDUCATION/TRAINING PROGRAM

## 2025-01-03 PROCEDURE — 3600000017 HC OR TIME - EACH INCREMENTAL 1 MINUTE - PROCEDURE LEVEL SIX: Performed by: STUDENT IN AN ORGANIZED HEALTH CARE EDUCATION/TRAINING PROGRAM

## 2025-01-03 PROCEDURE — C1821 INTERSPINOUS IMPLANT: HCPCS | Performed by: STUDENT IN AN ORGANIZED HEALTH CARE EDUCATION/TRAINING PROGRAM

## 2025-01-03 PROCEDURE — 72131 CT LUMBAR SPINE W/O DYE: CPT | Performed by: RADIOLOGY

## 2025-01-03 PROCEDURE — A22633 PR ARTHDSIS POST/POSTEROLATRL/POSTINTERBODY LUMBAR: Performed by: ANESTHESIOLOGIST ASSISTANT

## 2025-01-03 PROCEDURE — 2500000002 HC RX 250 W HCPCS SELF ADMINISTERED DRUGS (ALT 637 FOR MEDICARE OP, ALT 636 FOR OP/ED): Performed by: STUDENT IN AN ORGANIZED HEALTH CARE EDUCATION/TRAINING PROGRAM

## 2025-01-03 PROCEDURE — 2720000007 HC OR 272 NO HCPCS: Performed by: STUDENT IN AN ORGANIZED HEALTH CARE EDUCATION/TRAINING PROGRAM

## 2025-01-03 PROCEDURE — 3600000018 HC OR TIME - INITIAL BASE CHARGE - PROCEDURE LEVEL SIX: Performed by: STUDENT IN AN ORGANIZED HEALTH CARE EDUCATION/TRAINING PROGRAM

## 2025-01-03 PROCEDURE — 2500000004 HC RX 250 GENERAL PHARMACY W/ HCPCS (ALT 636 FOR OP/ED): Performed by: STUDENT IN AN ORGANIZED HEALTH CARE EDUCATION/TRAINING PROGRAM

## 2025-01-03 PROCEDURE — 01NR0ZZ RELEASE SACRAL NERVE, OPEN APPROACH: ICD-10-PCS | Performed by: STUDENT IN AN ORGANIZED HEALTH CARE EDUCATION/TRAINING PROGRAM

## 2025-01-03 PROCEDURE — 2500000004 HC RX 250 GENERAL PHARMACY W/ HCPCS (ALT 636 FOR OP/ED): Performed by: ANESTHESIOLOGY

## 2025-01-03 PROCEDURE — 8E0W0CZ ROBOTIC ASSISTED PROCEDURE OF TRUNK REGION, OPEN APPROACH: ICD-10-PCS | Performed by: STUDENT IN AN ORGANIZED HEALTH CARE EDUCATION/TRAINING PROGRAM

## 2025-01-03 PROCEDURE — 82947 ASSAY GLUCOSE BLOOD QUANT: CPT

## 2025-01-03 PROCEDURE — 72131 CT LUMBAR SPINE W/O DYE: CPT

## 2025-01-03 PROCEDURE — 3700000002 HC GENERAL ANESTHESIA TIME - EACH INCREMENTAL 1 MINUTE: Performed by: STUDENT IN AN ORGANIZED HEALTH CARE EDUCATION/TRAINING PROGRAM

## 2025-01-03 PROCEDURE — A22633 PR ARTHDSIS POST/POSTEROLATRL/POSTINTERBODY LUMBAR: Performed by: ANESTHESIOLOGY

## 2025-01-03 PROCEDURE — 2500000005 HC RX 250 GENERAL PHARMACY W/O HCPCS: Performed by: ANESTHESIOLOGY

## 2025-01-03 PROCEDURE — 3700000001 HC GENERAL ANESTHESIA TIME - INITIAL BASE CHARGE: Performed by: STUDENT IN AN ORGANIZED HEALTH CARE EDUCATION/TRAINING PROGRAM

## 2025-01-03 PROCEDURE — 1200000002 HC GENERAL ROOM WITH TELEMETRY DAILY

## 2025-01-03 PROCEDURE — 0SG30AJ FUSION OF LUMBOSACRAL JOINT WITH INTERBODY FUSION DEVICE, POSTERIOR APPROACH, ANTERIOR COLUMN, OPEN APPROACH: ICD-10-PCS | Performed by: STUDENT IN AN ORGANIZED HEALTH CARE EDUCATION/TRAINING PROGRAM

## 2025-01-03 PROCEDURE — 0ST40ZZ RESECTION OF LUMBOSACRAL DISC, OPEN APPROACH: ICD-10-PCS | Performed by: STUDENT IN AN ORGANIZED HEALTH CARE EDUCATION/TRAINING PROGRAM

## 2025-01-03 DEVICE — IMPLANTABLE DEVICE: Type: IMPLANTABLE DEVICE | Site: SPINE LUMBAR | Status: FUNCTIONAL

## 2025-01-03 DEVICE — TULIP, POLYAXIAL, 30MM, CREO MIS, REDUCTION: Type: IMPLANTABLE DEVICE | Site: SPINE LUMBAR | Status: FUNCTIONAL

## 2025-01-03 DEVICE — CAP, LOCKING, CREO MIS: Type: IMPLANTABLE DEVICE | Site: SPINE LUMBAR | Status: FUNCTIONAL

## 2025-01-03 RX ORDER — SODIUM CHLORIDE, SODIUM LACTATE, POTASSIUM CHLORIDE, CALCIUM CHLORIDE 600; 310; 30; 20 MG/100ML; MG/100ML; MG/100ML; MG/100ML
INJECTION, SOLUTION INTRAVENOUS CONTINUOUS PRN
Status: DISCONTINUED | OUTPATIENT
Start: 2025-01-03 | End: 2025-01-03

## 2025-01-03 RX ORDER — ROCURONIUM BROMIDE 10 MG/ML
INJECTION, SOLUTION INTRAVENOUS AS NEEDED
Status: DISCONTINUED | OUTPATIENT
Start: 2025-01-03 | End: 2025-01-03

## 2025-01-03 RX ORDER — LIDOCAINE HYDROCHLORIDE 10 MG/ML
0.1 INJECTION, SOLUTION INFILTRATION; PERINEURAL ONCE
Status: DISCONTINUED | OUTPATIENT
Start: 2025-01-03 | End: 2025-01-03 | Stop reason: HOSPADM

## 2025-01-03 RX ORDER — POLYETHYLENE GLYCOL 3350 17 G/17G
POWDER, FOR SOLUTION ORAL
Status: DISPENSED
Start: 2025-01-03 | End: 2025-01-04

## 2025-01-03 RX ORDER — PROPOFOL 10 MG/ML
INJECTION, EMULSION INTRAVENOUS AS NEEDED
Status: DISCONTINUED | OUTPATIENT
Start: 2025-01-03 | End: 2025-01-03

## 2025-01-03 RX ORDER — DROPERIDOL 2.5 MG/ML
0.62 INJECTION, SOLUTION INTRAMUSCULAR; INTRAVENOUS ONCE AS NEEDED
Status: COMPLETED | OUTPATIENT
Start: 2025-01-03 | End: 2025-01-03

## 2025-01-03 RX ORDER — ACETAMINOPHEN 325 MG/1
975 TABLET ORAL ONCE
Status: COMPLETED | OUTPATIENT
Start: 2025-01-03 | End: 2025-01-03

## 2025-01-03 RX ORDER — METHOCARBAMOL 100 MG/ML
500 INJECTION, SOLUTION INTRAMUSCULAR; INTRAVENOUS ONCE
Status: COMPLETED | OUTPATIENT
Start: 2025-01-03 | End: 2025-01-03

## 2025-01-03 RX ORDER — POLYETHYLENE GLYCOL 3350 17 G/17G
17 POWDER, FOR SOLUTION ORAL
Status: DISCONTINUED | OUTPATIENT
Start: 2025-01-03 | End: 2025-01-03 | Stop reason: SDUPTHER

## 2025-01-03 RX ORDER — HYDROMORPHONE HYDROCHLORIDE 0.2 MG/ML
0.2 INJECTION INTRAMUSCULAR; INTRAVENOUS; SUBCUTANEOUS EVERY 4 HOURS PRN
Status: DISCONTINUED | OUTPATIENT
Start: 2025-01-03 | End: 2025-01-04

## 2025-01-03 RX ORDER — ACETAMINOPHEN 325 MG/1
650 TABLET ORAL EVERY 6 HOURS
Status: DISCONTINUED | OUTPATIENT
Start: 2025-01-03 | End: 2025-01-05 | Stop reason: HOSPADM

## 2025-01-03 RX ORDER — GABAPENTIN 300 MG/1
300 CAPSULE ORAL EVERY 8 HOURS SCHEDULED
Status: DISCONTINUED | OUTPATIENT
Start: 2025-01-03 | End: 2025-01-04

## 2025-01-03 RX ORDER — OXYCODONE HYDROCHLORIDE 10 MG/1
10 TABLET ORAL EVERY 4 HOURS PRN
Status: DISCONTINUED | OUTPATIENT
Start: 2025-01-03 | End: 2025-01-05 | Stop reason: HOSPADM

## 2025-01-03 RX ORDER — NORETHINDRONE AND ETHINYL ESTRADIOL 0.5-0.035
KIT ORAL AS NEEDED
Status: DISCONTINUED | OUTPATIENT
Start: 2025-01-03 | End: 2025-01-03

## 2025-01-03 RX ORDER — ONDANSETRON HYDROCHLORIDE 2 MG/ML
INJECTION, SOLUTION INTRAVENOUS AS NEEDED
Status: DISCONTINUED | OUTPATIENT
Start: 2025-01-03 | End: 2025-01-03

## 2025-01-03 RX ORDER — TRANEXAMIC ACID 650 MG/1
1300 TABLET ORAL ONCE
Status: COMPLETED | OUTPATIENT
Start: 2025-01-03 | End: 2025-01-03

## 2025-01-03 RX ORDER — HYDROMORPHONE HYDROCHLORIDE 0.2 MG/ML
0.2 INJECTION INTRAMUSCULAR; INTRAVENOUS; SUBCUTANEOUS EVERY 5 MIN PRN
Status: DISCONTINUED | OUTPATIENT
Start: 2025-01-03 | End: 2025-01-03 | Stop reason: HOSPADM

## 2025-01-03 RX ORDER — DEXTROSE 50 % IN WATER (D50W) INTRAVENOUS SYRINGE
25
Status: DISCONTINUED | OUTPATIENT
Start: 2025-01-03 | End: 2025-01-05 | Stop reason: HOSPADM

## 2025-01-03 RX ORDER — AMOXICILLIN 250 MG
2 CAPSULE ORAL 2 TIMES DAILY
Status: DISCONTINUED | OUTPATIENT
Start: 2025-01-03 | End: 2025-01-05 | Stop reason: HOSPADM

## 2025-01-03 RX ORDER — GLYCOPYRROLATE 0.2 MG/ML
INJECTION INTRAMUSCULAR; INTRAVENOUS AS NEEDED
Status: DISCONTINUED | OUTPATIENT
Start: 2025-01-03 | End: 2025-01-03

## 2025-01-03 RX ORDER — HEPARIN SODIUM 5000 [USP'U]/ML
5000 INJECTION, SOLUTION INTRAVENOUS; SUBCUTANEOUS EVERY 8 HOURS
Status: DISCONTINUED | OUTPATIENT
Start: 2025-01-04 | End: 2025-01-05 | Stop reason: HOSPADM

## 2025-01-03 RX ORDER — BUPIVACAINE HYDROCHLORIDE 2.5 MG/ML
INJECTION, SOLUTION INFILTRATION; PERINEURAL AS NEEDED
Status: DISCONTINUED | OUTPATIENT
Start: 2025-01-03 | End: 2025-01-03 | Stop reason: HOSPADM

## 2025-01-03 RX ORDER — POLYETHYLENE GLYCOL 3350 17 G/17G
17 POWDER, FOR SOLUTION ORAL 2 TIMES DAILY
Status: DISCONTINUED | OUTPATIENT
Start: 2025-01-03 | End: 2025-01-05 | Stop reason: HOSPADM

## 2025-01-03 RX ORDER — HYDROMORPHONE HYDROCHLORIDE 1 MG/ML
INJECTION, SOLUTION INTRAMUSCULAR; INTRAVENOUS; SUBCUTANEOUS AS NEEDED
Status: DISCONTINUED | OUTPATIENT
Start: 2025-01-03 | End: 2025-01-03

## 2025-01-03 RX ORDER — CEFAZOLIN 1 G/1
INJECTION, POWDER, FOR SOLUTION INTRAVENOUS AS NEEDED
Status: DISCONTINUED | OUTPATIENT
Start: 2025-01-03 | End: 2025-01-03

## 2025-01-03 RX ORDER — CELECOXIB 200 MG/1
400 CAPSULE ORAL ONCE
Status: COMPLETED | OUTPATIENT
Start: 2025-01-03 | End: 2025-01-03

## 2025-01-03 RX ORDER — DEXTROSE 50 % IN WATER (D50W) INTRAVENOUS SYRINGE
12.5
Status: DISCONTINUED | OUTPATIENT
Start: 2025-01-03 | End: 2025-01-05 | Stop reason: HOSPADM

## 2025-01-03 RX ORDER — FENTANYL CITRATE 50 UG/ML
INJECTION, SOLUTION INTRAMUSCULAR; INTRAVENOUS AS NEEDED
Status: DISCONTINUED | OUTPATIENT
Start: 2025-01-03 | End: 2025-01-03

## 2025-01-03 RX ORDER — HYDROMORPHONE HYDROCHLORIDE 0.2 MG/ML
0.1 INJECTION INTRAMUSCULAR; INTRAVENOUS; SUBCUTANEOUS EVERY 5 MIN PRN
Status: DISCONTINUED | OUTPATIENT
Start: 2025-01-03 | End: 2025-01-03 | Stop reason: HOSPADM

## 2025-01-03 RX ORDER — NALOXONE HYDROCHLORIDE 0.4 MG/ML
0.2 INJECTION, SOLUTION INTRAMUSCULAR; INTRAVENOUS; SUBCUTANEOUS EVERY 5 MIN PRN
Status: DISCONTINUED | OUTPATIENT
Start: 2025-01-03 | End: 2025-01-05 | Stop reason: HOSPADM

## 2025-01-03 RX ORDER — VANCOMYCIN HYDROCHLORIDE 1 G/20ML
INJECTION, POWDER, LYOPHILIZED, FOR SOLUTION INTRAVENOUS AS NEEDED
Status: DISCONTINUED | OUTPATIENT
Start: 2025-01-03 | End: 2025-01-03

## 2025-01-03 RX ORDER — CYCLOBENZAPRINE HCL 10 MG
10 TABLET ORAL 3 TIMES DAILY
Status: DISCONTINUED | OUTPATIENT
Start: 2025-01-03 | End: 2025-01-05 | Stop reason: HOSPADM

## 2025-01-03 RX ORDER — METOPROLOL TARTRATE 50 MG/1
50 TABLET ORAL EVERY 12 HOURS
Status: DISCONTINUED | OUTPATIENT
Start: 2025-01-03 | End: 2025-01-05 | Stop reason: HOSPADM

## 2025-01-03 RX ORDER — ACETAMINOPHEN 325 MG/1
975 TABLET ORAL ONCE
Status: DISCONTINUED | OUTPATIENT
Start: 2025-01-03 | End: 2025-01-05 | Stop reason: HOSPADM

## 2025-01-03 RX ORDER — LANOLIN ALCOHOL/MO/W.PET/CERES
400 CREAM (GRAM) TOPICAL
Status: DISCONTINUED | OUTPATIENT
Start: 2025-01-03 | End: 2025-01-05 | Stop reason: HOSPADM

## 2025-01-03 RX ORDER — HEPARIN SODIUM 5000 [USP'U]/ML
5000 INJECTION, SOLUTION INTRAVENOUS; SUBCUTANEOUS EVERY 8 HOURS
Status: DISCONTINUED | OUTPATIENT
Start: 2025-01-04 | End: 2025-01-03

## 2025-01-03 RX ORDER — LIDOCAINE HYDROCHLORIDE 20 MG/ML
INJECTION, SOLUTION INFILTRATION; PERINEURAL AS NEEDED
Status: DISCONTINUED | OUTPATIENT
Start: 2025-01-03 | End: 2025-01-03

## 2025-01-03 RX ORDER — MIDAZOLAM HYDROCHLORIDE 1 MG/ML
INJECTION INTRAMUSCULAR; INTRAVENOUS AS NEEDED
Status: DISCONTINUED | OUTPATIENT
Start: 2025-01-03 | End: 2025-01-03

## 2025-01-03 RX ORDER — DILTIAZEM HYDROCHLORIDE 240 MG/1
240 CAPSULE, COATED, EXTENDED RELEASE ORAL
Status: DISCONTINUED | OUTPATIENT
Start: 2025-01-04 | End: 2025-01-05 | Stop reason: HOSPADM

## 2025-01-03 RX ORDER — OXYCODONE HYDROCHLORIDE 5 MG/1
5 TABLET ORAL EVERY 4 HOURS PRN
Status: DISCONTINUED | OUTPATIENT
Start: 2025-01-03 | End: 2025-01-05 | Stop reason: HOSPADM

## 2025-01-03 RX ORDER — LEVOTHYROXINE SODIUM 88 UG/1
88 TABLET ORAL DAILY
Status: DISCONTINUED | OUTPATIENT
Start: 2025-01-04 | End: 2025-01-05 | Stop reason: HOSPADM

## 2025-01-03 RX ORDER — ATORVASTATIN CALCIUM 20 MG/1
20 TABLET, FILM COATED ORAL DAILY
Status: DISCONTINUED | OUTPATIENT
Start: 2025-01-03 | End: 2025-01-05 | Stop reason: HOSPADM

## 2025-01-03 RX ADMIN — OXYCODONE HYDROCHLORIDE 10 MG: 10 TABLET ORAL at 17:26

## 2025-01-03 RX ADMIN — PROMETHAZINE HYDROCHLORIDE 6.25 MG: 25 INJECTION INTRAMUSCULAR; INTRAVENOUS at 13:20

## 2025-01-03 RX ADMIN — FENTANYL CITRATE 50 MCG: 50 INJECTION, SOLUTION INTRAMUSCULAR; INTRAVENOUS at 08:27

## 2025-01-03 RX ADMIN — ONDANSETRON 4 MG: 2 INJECTION INTRAMUSCULAR; INTRAVENOUS at 12:27

## 2025-01-03 RX ADMIN — EPHEDRINE SULFATE 5 MG: 50 INJECTION, SOLUTION INTRAVENOUS at 08:57

## 2025-01-03 RX ADMIN — ROCURONIUM BROMIDE 20 MG: 10 INJECTION INTRAVENOUS at 11:42

## 2025-01-03 RX ADMIN — POVIDONE-IODINE 1 APPLICATION: 5 SOLUTION TOPICAL at 06:50

## 2025-01-03 RX ADMIN — TRANEXAMIC ACID 1300 MG: 650 TABLET ORAL at 18:23

## 2025-01-03 RX ADMIN — METHOCARBAMOL 500 MG: 1000 INJECTION, SOLUTION INTRAMUSCULAR; INTRAVENOUS at 14:40

## 2025-01-03 RX ADMIN — HYDROMORPHONE HYDROCHLORIDE 0.5 MG: 1 INJECTION, SOLUTION INTRAMUSCULAR; INTRAVENOUS; SUBCUTANEOUS at 14:38

## 2025-01-03 RX ADMIN — HYDROMORPHONE HYDROCHLORIDE 0.5 MG: 1 INJECTION, SOLUTION INTRAMUSCULAR; INTRAVENOUS; SUBCUTANEOUS at 13:23

## 2025-01-03 RX ADMIN — SUGAMMADEX 200 MG: 100 INJECTION, SOLUTION INTRAVENOUS at 12:44

## 2025-01-03 RX ADMIN — ATORVASTATIN CALCIUM 20 MG: 20 TABLET, FILM COATED ORAL at 21:15

## 2025-01-03 RX ADMIN — METHOCARBAMOL 500 MG: 1000 INJECTION, SOLUTION INTRAMUSCULAR; INTRAVENOUS at 14:58

## 2025-01-03 RX ADMIN — ROCURONIUM BROMIDE 20 MG: 10 INJECTION INTRAVENOUS at 09:42

## 2025-01-03 RX ADMIN — HYDROMORPHONE HYDROCHLORIDE 0.25 MG: 1 INJECTION, SOLUTION INTRAMUSCULAR; INTRAVENOUS; SUBCUTANEOUS at 11:30

## 2025-01-03 RX ADMIN — PROPOFOL 200 MG: 10 INJECTION, EMULSION INTRAVENOUS at 07:42

## 2025-01-03 RX ADMIN — VANCOMYCIN HYDROCHLORIDE 2 G: 1025 INJECTION, POWDER, FOR SOLUTION INTRAVENOUS; ORAL at 08:04

## 2025-01-03 RX ADMIN — ROCURONIUM BROMIDE 60 MG: 10 INJECTION INTRAVENOUS at 07:42

## 2025-01-03 RX ADMIN — MIDAZOLAM HYDROCHLORIDE 2 MG: 1 INJECTION, SOLUTION INTRAMUSCULAR; INTRAVENOUS at 07:35

## 2025-01-03 RX ADMIN — CEFAZOLIN 2 G: 1 INJECTION, POWDER, FOR SOLUTION INTRAMUSCULAR; INTRAVENOUS at 08:09

## 2025-01-03 RX ADMIN — EPHEDRINE SULFATE 5 MG: 50 INJECTION, SOLUTION INTRAVENOUS at 08:47

## 2025-01-03 RX ADMIN — SODIUM CHLORIDE, POTASSIUM CHLORIDE, SODIUM LACTATE AND CALCIUM CHLORIDE: 600; 310; 30; 20 INJECTION, SOLUTION INTRAVENOUS at 07:35

## 2025-01-03 RX ADMIN — DEXAMETHASONE SODIUM PHOSPHATE 4 MG: 4 INJECTION INTRA-ARTICULAR; INTRALESIONAL; INTRAMUSCULAR; INTRAVENOUS; SOFT TISSUE at 07:42

## 2025-01-03 RX ADMIN — GABAPENTIN 300 MG: 300 CAPSULE ORAL at 17:26

## 2025-01-03 RX ADMIN — CELECOXIB 400 MG: 200 CAPSULE ORAL at 18:24

## 2025-01-03 RX ADMIN — LIDOCAINE HYDROCHLORIDE 100 MG: 20 INJECTION, SOLUTION INFILTRATION; PERINEURAL at 07:42

## 2025-01-03 RX ADMIN — CYCLOBENZAPRINE 10 MG: 10 TABLET, FILM COATED ORAL at 17:26

## 2025-01-03 RX ADMIN — HYDROMORPHONE HYDROCHLORIDE 0.25 MG: 1 INJECTION, SOLUTION INTRAMUSCULAR; INTRAVENOUS; SUBCUTANEOUS at 11:42

## 2025-01-03 RX ADMIN — HYDROMORPHONE HYDROCHLORIDE 0.5 MG: 1 INJECTION, SOLUTION INTRAMUSCULAR; INTRAVENOUS; SUBCUTANEOUS at 13:49

## 2025-01-03 RX ADMIN — Medication 6 L/MIN: at 12:58

## 2025-01-03 RX ADMIN — HYDROMORPHONE HYDROCHLORIDE 0.25 MG: 1 INJECTION, SOLUTION INTRAMUSCULAR; INTRAVENOUS; SUBCUTANEOUS at 11:07

## 2025-01-03 RX ADMIN — SENNOSIDES AND DOCUSATE SODIUM 2 TABLET: 50; 8.6 TABLET ORAL at 21:15

## 2025-01-03 RX ADMIN — CELECOXIB 400 MG: 200 CAPSULE ORAL at 06:21

## 2025-01-03 RX ADMIN — OXYCODONE HYDROCHLORIDE 10 MG: 10 TABLET ORAL at 21:29

## 2025-01-03 RX ADMIN — METOPROLOL TARTRATE 50 MG: 50 TABLET, FILM COATED ORAL at 21:15

## 2025-01-03 RX ADMIN — HYDROMORPHONE HYDROCHLORIDE 0.5 MG: 1 INJECTION, SOLUTION INTRAMUSCULAR; INTRAVENOUS; SUBCUTANEOUS at 14:09

## 2025-01-03 RX ADMIN — CEFAZOLIN 2 G: 1 INJECTION, POWDER, FOR SOLUTION INTRAMUSCULAR; INTRAVENOUS at 12:07

## 2025-01-03 RX ADMIN — HYDROMORPHONE HYDROCHLORIDE 0.2 MG: 0.2 INJECTION, SOLUTION INTRAMUSCULAR; INTRAVENOUS; SUBCUTANEOUS at 21:16

## 2025-01-03 RX ADMIN — DROPERIDOL 0.62 MG: 2.5 INJECTION, SOLUTION INTRAMUSCULAR; INTRAVENOUS at 14:46

## 2025-01-03 RX ADMIN — ROCURONIUM BROMIDE 30 MG: 10 INJECTION INTRAVENOUS at 10:07

## 2025-01-03 RX ADMIN — ROCURONIUM BROMIDE 30 MG: 10 INJECTION INTRAVENOUS at 10:58

## 2025-01-03 RX ADMIN — ACETAMINOPHEN 650 MG: 325 TABLET ORAL at 17:26

## 2025-01-03 RX ADMIN — TRANEXAMIC ACID 1300 MG: 650 TABLET ORAL at 06:22

## 2025-01-03 RX ADMIN — POLYETHYLENE GLYCOL 3350 17 G: 17 POWDER, FOR SOLUTION ORAL at 21:26

## 2025-01-03 RX ADMIN — ROCURONIUM BROMIDE 40 MG: 10 INJECTION INTRAVENOUS at 08:27

## 2025-01-03 RX ADMIN — GLYCOPYRROLATE 0.1 MG: 0.2 INJECTION, SOLUTION INTRAMUSCULAR; INTRAVENOUS at 07:35

## 2025-01-03 RX ADMIN — FENTANYL CITRATE 50 MCG: 50 INJECTION, SOLUTION INTRAMUSCULAR; INTRAVENOUS at 07:42

## 2025-01-03 RX ADMIN — HYDROMORPHONE HYDROCHLORIDE 0.25 MG: 1 INJECTION, SOLUTION INTRAMUSCULAR; INTRAVENOUS; SUBCUTANEOUS at 10:05

## 2025-01-03 RX ADMIN — ACETAMINOPHEN 975 MG: 325 TABLET ORAL at 06:21

## 2025-01-03 RX ADMIN — MAGNESIUM OXIDE TAB 400 MG (241.3 MG ELEMENTAL MG) 400 MG: 400 (241.3 MG) TAB at 17:30

## 2025-01-03 RX ADMIN — ACETAMINOPHEN 650 MG: 325 TABLET ORAL at 23:11

## 2025-01-03 SDOH — HEALTH STABILITY: MENTAL HEALTH: CURRENT SMOKER: 0

## 2025-01-03 ASSESSMENT — COGNITIVE AND FUNCTIONAL STATUS - GENERAL
DRESSING REGULAR UPPER BODY CLOTHING: A LITTLE
TOILETING: A LITTLE
MOVING TO AND FROM BED TO CHAIR: A LITTLE
PERSONAL GROOMING: A LITTLE
CLIMB 3 TO 5 STEPS WITH RAILING: A LITTLE
DRESSING REGULAR LOWER BODY CLOTHING: A LITTLE
MOBILITY SCORE: 19
HELP NEEDED FOR BATHING: A LITTLE
CLIMB 3 TO 5 STEPS WITH RAILING: A LITTLE
WALKING IN HOSPITAL ROOM: A LITTLE
HELP NEEDED FOR BATHING: A LITTLE
DAILY ACTIVITIY SCORE: 22
WALKING IN HOSPITAL ROOM: A LITTLE
STANDING UP FROM CHAIR USING ARMS: A LITTLE
DAILY ACTIVITIY SCORE: 19
DRESSING REGULAR LOWER BODY CLOTHING: A LITTLE
TURNING FROM BACK TO SIDE WHILE IN FLAT BAD: A LITTLE
MOBILITY SCORE: 22

## 2025-01-03 ASSESSMENT — PAIN DESCRIPTION - DESCRIPTORS
DESCRIPTORS: ACHING

## 2025-01-03 ASSESSMENT — PAIN - FUNCTIONAL ASSESSMENT
PAIN_FUNCTIONAL_ASSESSMENT: 0-10
PAIN_FUNCTIONAL_ASSESSMENT: UNABLE TO SELF-REPORT
PAIN_FUNCTIONAL_ASSESSMENT: 0-10
PAIN_FUNCTIONAL_ASSESSMENT: UNABLE TO SELF-REPORT
PAIN_FUNCTIONAL_ASSESSMENT: 0-10
PAIN_FUNCTIONAL_ASSESSMENT: UNABLE TO SELF-REPORT
PAIN_FUNCTIONAL_ASSESSMENT: 0-10
PAIN_FUNCTIONAL_ASSESSMENT: UNABLE TO SELF-REPORT
PAIN_FUNCTIONAL_ASSESSMENT: UNABLE TO SELF-REPORT

## 2025-01-03 ASSESSMENT — PAIN SCALES - PAIN ASSESSMENT IN ADVANCED DEMENTIA (PAINAD)
TOTALSCORE: 0
BREATHING: NORMAL
BODYLANGUAGE: RELAXED
TOTALSCORE: MEDICATION (SEE MAR)
CONSOLABILITY: NO NEED TO CONSOLE
FACIALEXPRESSION: SMILING OR INEXPRESSIVE

## 2025-01-03 ASSESSMENT — PAIN SCALES - GENERAL
PAINLEVEL_OUTOF10: 7
PAINLEVEL_OUTOF10: 7
PAINLEVEL_OUTOF10: 8
PAINLEVEL_OUTOF10: 7
PAINLEVEL_OUTOF10: 9
PAINLEVEL_OUTOF10: 7
PAINLEVEL_OUTOF10: 7
PAINLEVEL_OUTOF10: 8
PAINLEVEL_OUTOF10: 8
PAINLEVEL_OUTOF10: 7

## 2025-01-03 ASSESSMENT — COLUMBIA-SUICIDE SEVERITY RATING SCALE - C-SSRS
6. HAVE YOU EVER DONE ANYTHING, STARTED TO DO ANYTHING, OR PREPARED TO DO ANYTHING TO END YOUR LIFE?: NO
2. HAVE YOU ACTUALLY HAD ANY THOUGHTS OF KILLING YOURSELF?: NO
1. IN THE PAST MONTH, HAVE YOU WISHED YOU WERE DEAD OR WISHED YOU COULD GO TO SLEEP AND NOT WAKE UP?: NO

## 2025-01-03 ASSESSMENT — PAIN DESCRIPTION - ORIENTATION: ORIENTATION: LOWER

## 2025-01-03 ASSESSMENT — PAIN DESCRIPTION - LOCATION: LOCATION: BACK

## 2025-01-03 ASSESSMENT — ENCOUNTER SYMPTOMS: STRIDOR: 0

## 2025-01-03 NOTE — SIGNIFICANT EVENT
NEUROSURGERY UPDATE    Please note this is an L5-S1 TLIF as indicated in Dr. Aldana's index clinic note, not a L4-5 TLIF as indicated in the operative booking.

## 2025-01-03 NOTE — ANESTHESIA PREPROCEDURE EVALUATION
Patient: Wilfred Lubin    Procedure Information       Anesthesia Start Date/Time: 01/03/25 0735    Procedure: L4-5 Robotic-assisted minimally invasive transforaminal lumbar interbody fusion    Location: [facility] OR 23 / [facility]    Surgeons: Gallo Aldana MD PhD            Relevant Problems   Musculoskeletal   (+) Foraminal stenosis of lumbosacral region   CAD s/p PCI 2019 c/b sepsis and prolonged mechanical vent for which he had a tracheostomy. Now closed. Doing well from cardiac and respiratory standpoint for the last few years. States he had a negative stress test recently and is followed closely by his ourpatient physicians for CAD      Surgeon's note reviewed and appreciated. Discussed plan with him and patient in preop today  EBL estimated < 100 mL, 3 hour surgical time      Clinical information reviewed:   Tobacco  Allergies  Meds   Med Hx  Surg Hx   Fam Hx  Soc Hx        NPO Detail:  NPO/Void Status  Carbohydrate Drink Given Prior to Surgery? : N  Date of Last Liquid: 01/03/25  Time of Last Liquid: 0000  Date of Last Solid: 01/03/25  Time of Last Solid: 0000  Last Intake Type: Clear fluids  Time of Last Void: 0612         Physical Exam    Airway  Mallampati: I  TM distance: >3 FB  Neck ROM: full     Cardiovascular - normal exam  Rhythm: regular  Rate: normal  (-) murmur     Dental   (+) upper dentures, lower dentures  Comments: Removed both dentures   Pulmonary - normal exam  Breath sounds clear to auscultation  (-) decreased breath sounds, wheezes, stridor     Abdominal   Abdomen: soft  Bowel sounds: normal     Other findings: Closed stoma of trach      Anesthesia Plan    History of general anesthesia?: yes  History of complications of general anesthesia?: no    ASA 3     general   (GA ETT  2nd PIV  Possible art line   Swelling and other risks of prone positioning discussed  )  The patient is not a current smoker.  Education provided regarding risk of obstructive sleep  apnea.  intravenous induction   Postoperative administration of opioids is intended.  Trial extubation is planned.  Anesthetic plan and risks discussed with spouse and patient.  Use of blood products discussed with patient and spouse who.    Plan discussed with attending.

## 2025-01-03 NOTE — ANESTHESIA PROCEDURE NOTES
Peripheral IV  Date/Time: 1/3/2025 7:49 AM  Inserted by: SNEHA Hussein    Placement  Needle size: 16 G  Laterality: left  Location: hand  Local anesthetic: none  Site prep: alcohol  Technique: anatomical landmarks  Attempts: 1

## 2025-01-03 NOTE — HOSPITAL COURSE
Past Medical History:   Diagnosis Date    Arrhythmia     Chronic pain disorder     Coronary artery disease     Hypertension     Myocardial infarction (Multi)          Mr. Lubin is a 54 yo male with history of CAD on ASA, HTN, chronic LLE pain/foot drop s/p ankle fusion p/w LLE pain, weakness, 1/3 s/p L5-S1 MIS TLIF  1/4 Upright Xrays with hardware in position    After surgery patient was doing well with improved LLE pain. PTOT evaluated and determined patient to have no additional needs.     On day of discharge, patient was doing well and medically ready.

## 2025-01-04 ENCOUNTER — APPOINTMENT (OUTPATIENT)
Dept: RADIOLOGY | Facility: HOSPITAL | Age: 60
DRG: 451 | End: 2025-01-04
Payer: COMMERCIAL

## 2025-01-04 LAB
ANION GAP SERPL CALC-SCNC: 15 MMOL/L (ref 10–20)
BUN SERPL-MCNC: 19 MG/DL (ref 6–23)
CALCIUM SERPL-MCNC: 9 MG/DL (ref 8.6–10.6)
CHLORIDE SERPL-SCNC: 98 MMOL/L (ref 98–107)
CO2 SERPL-SCNC: 28 MMOL/L (ref 21–32)
CREAT SERPL-MCNC: 0.99 MG/DL (ref 0.5–1.3)
EGFRCR SERPLBLD CKD-EPI 2021: 89 ML/MIN/1.73M*2
ERYTHROCYTE [DISTWIDTH] IN BLOOD BY AUTOMATED COUNT: 11.9 % (ref 11.5–14.5)
GLUCOSE BLD MANUAL STRIP-MCNC: 101 MG/DL (ref 74–99)
GLUCOSE BLD MANUAL STRIP-MCNC: 123 MG/DL (ref 74–99)
GLUCOSE BLD MANUAL STRIP-MCNC: 124 MG/DL (ref 74–99)
GLUCOSE SERPL-MCNC: 112 MG/DL (ref 74–99)
HCT VFR BLD AUTO: 39.3 % (ref 41–52)
HGB BLD-MCNC: 13.2 G/DL (ref 13.5–17.5)
MCH RBC QN AUTO: 31.1 PG (ref 26–34)
MCHC RBC AUTO-ENTMCNC: 33.6 G/DL (ref 32–36)
MCV RBC AUTO: 93 FL (ref 80–100)
NRBC BLD-RTO: 0 /100 WBCS (ref 0–0)
PLATELET # BLD AUTO: 169 X10*3/UL (ref 150–450)
POTASSIUM SERPL-SCNC: 4.6 MMOL/L (ref 3.5–5.3)
RBC # BLD AUTO: 4.24 X10*6/UL (ref 4.5–5.9)
SODIUM SERPL-SCNC: 136 MMOL/L (ref 136–145)
WBC # BLD AUTO: 14.3 X10*3/UL (ref 4.4–11.3)

## 2025-01-04 PROCEDURE — 72100 X-RAY EXAM L-S SPINE 2/3 VWS: CPT

## 2025-01-04 PROCEDURE — 2500000004 HC RX 250 GENERAL PHARMACY W/ HCPCS (ALT 636 FOR OP/ED): Performed by: STUDENT IN AN ORGANIZED HEALTH CARE EDUCATION/TRAINING PROGRAM

## 2025-01-04 PROCEDURE — 97535 SELF CARE MNGMENT TRAINING: CPT | Mod: GO

## 2025-01-04 PROCEDURE — 80048 BASIC METABOLIC PNL TOTAL CA: CPT | Performed by: STUDENT IN AN ORGANIZED HEALTH CARE EDUCATION/TRAINING PROGRAM

## 2025-01-04 PROCEDURE — 2500000001 HC RX 250 WO HCPCS SELF ADMINISTERED DRUGS (ALT 637 FOR MEDICARE OP): Performed by: STUDENT IN AN ORGANIZED HEALTH CARE EDUCATION/TRAINING PROGRAM

## 2025-01-04 PROCEDURE — 97165 OT EVAL LOW COMPLEX 30 MIN: CPT | Mod: GO

## 2025-01-04 PROCEDURE — 97530 THERAPEUTIC ACTIVITIES: CPT | Mod: GO

## 2025-01-04 PROCEDURE — 36415 COLL VENOUS BLD VENIPUNCTURE: CPT | Performed by: STUDENT IN AN ORGANIZED HEALTH CARE EDUCATION/TRAINING PROGRAM

## 2025-01-04 PROCEDURE — 85027 COMPLETE CBC AUTOMATED: CPT | Performed by: STUDENT IN AN ORGANIZED HEALTH CARE EDUCATION/TRAINING PROGRAM

## 2025-01-04 PROCEDURE — 97116 GAIT TRAINING THERAPY: CPT | Mod: GP

## 2025-01-04 PROCEDURE — 2500000004 HC RX 250 GENERAL PHARMACY W/ HCPCS (ALT 636 FOR OP/ED)

## 2025-01-04 PROCEDURE — 97161 PT EVAL LOW COMPLEX 20 MIN: CPT | Mod: GP

## 2025-01-04 PROCEDURE — 82947 ASSAY GLUCOSE BLOOD QUANT: CPT

## 2025-01-04 PROCEDURE — 2500000001 HC RX 250 WO HCPCS SELF ADMINISTERED DRUGS (ALT 637 FOR MEDICARE OP)

## 2025-01-04 PROCEDURE — 72100 X-RAY EXAM L-S SPINE 2/3 VWS: CPT | Performed by: STUDENT IN AN ORGANIZED HEALTH CARE EDUCATION/TRAINING PROGRAM

## 2025-01-04 PROCEDURE — 1200000002 HC GENERAL ROOM WITH TELEMETRY DAILY

## 2025-01-04 PROCEDURE — 2500000002 HC RX 250 W HCPCS SELF ADMINISTERED DRUGS (ALT 637 FOR MEDICARE OP, ALT 636 FOR OP/ED): Performed by: STUDENT IN AN ORGANIZED HEALTH CARE EDUCATION/TRAINING PROGRAM

## 2025-01-04 RX ORDER — GABAPENTIN 300 MG/1
300 CAPSULE ORAL DAILY
Status: DISCONTINUED | OUTPATIENT
Start: 2025-01-05 | End: 2025-01-05 | Stop reason: HOSPADM

## 2025-01-04 RX ORDER — GABAPENTIN 300 MG/1
900 CAPSULE ORAL EVERY EVENING
Status: DISCONTINUED | OUTPATIENT
Start: 2025-01-04 | End: 2025-01-05 | Stop reason: HOSPADM

## 2025-01-04 RX ORDER — CALCIUM CARBONATE 200(500)MG
500 TABLET,CHEWABLE ORAL DAILY
Status: DISCONTINUED | OUTPATIENT
Start: 2025-01-04 | End: 2025-01-05 | Stop reason: HOSPADM

## 2025-01-04 RX ORDER — GABAPENTIN 300 MG/1
600 CAPSULE ORAL EVERY MORNING
Status: DISCONTINUED | OUTPATIENT
Start: 2025-01-05 | End: 2025-01-05 | Stop reason: HOSPADM

## 2025-01-04 RX ORDER — GABAPENTIN 300 MG/1
600 CAPSULE ORAL EVERY 8 HOURS SCHEDULED
Status: DISCONTINUED | OUTPATIENT
Start: 2025-01-04 | End: 2025-01-04

## 2025-01-04 RX ADMIN — POLYETHYLENE GLYCOL 3350 17 G: 17 POWDER, FOR SOLUTION ORAL at 20:08

## 2025-01-04 RX ADMIN — CYCLOBENZAPRINE 10 MG: 10 TABLET, FILM COATED ORAL at 20:08

## 2025-01-04 RX ADMIN — METOPROLOL TARTRATE 50 MG: 50 TABLET, FILM COATED ORAL at 20:08

## 2025-01-04 RX ADMIN — ATORVASTATIN CALCIUM 20 MG: 20 TABLET, FILM COATED ORAL at 20:08

## 2025-01-04 RX ADMIN — ACETAMINOPHEN 650 MG: 325 TABLET ORAL at 16:33

## 2025-01-04 RX ADMIN — ACETAMINOPHEN 650 MG: 325 TABLET ORAL at 23:09

## 2025-01-04 RX ADMIN — CYCLOBENZAPRINE 10 MG: 10 TABLET, FILM COATED ORAL at 00:32

## 2025-01-04 RX ADMIN — SENNOSIDES AND DOCUSATE SODIUM 2 TABLET: 50; 8.6 TABLET ORAL at 08:58

## 2025-01-04 RX ADMIN — OXYCODONE HYDROCHLORIDE 10 MG: 10 TABLET ORAL at 16:33

## 2025-01-04 RX ADMIN — GABAPENTIN 300 MG: 300 CAPSULE ORAL at 08:57

## 2025-01-04 RX ADMIN — OXYCODONE HYDROCHLORIDE 10 MG: 10 TABLET ORAL at 20:37

## 2025-01-04 RX ADMIN — HYDROMORPHONE HYDROCHLORIDE 0.2 MG: 0.2 INJECTION, SOLUTION INTRAMUSCULAR; INTRAVENOUS; SUBCUTANEOUS at 09:40

## 2025-01-04 RX ADMIN — MAGNESIUM OXIDE TAB 400 MG (241.3 MG ELEMENTAL MG) 400 MG: 400 (241.3 MG) TAB at 05:43

## 2025-01-04 RX ADMIN — OXYCODONE HYDROCHLORIDE 10 MG: 10 TABLET ORAL at 12:26

## 2025-01-04 RX ADMIN — LEVOTHYROXINE SODIUM 88 MCG: 0.09 TABLET ORAL at 05:42

## 2025-01-04 RX ADMIN — METOPROLOL TARTRATE 50 MG: 50 TABLET, FILM COATED ORAL at 08:58

## 2025-01-04 RX ADMIN — GABAPENTIN 300 MG: 300 CAPSULE ORAL at 14:05

## 2025-01-04 RX ADMIN — CYCLOBENZAPRINE 10 MG: 10 TABLET, FILM COATED ORAL at 08:58

## 2025-01-04 RX ADMIN — HEPARIN SODIUM 5000 UNITS: 5000 INJECTION INTRAVENOUS; SUBCUTANEOUS at 14:05

## 2025-01-04 RX ADMIN — HYDROMORPHONE HYDROCHLORIDE 0.2 MG: 0.2 INJECTION, SOLUTION INTRAMUSCULAR; INTRAVENOUS; SUBCUTANEOUS at 01:28

## 2025-01-04 RX ADMIN — GABAPENTIN 900 MG: 300 CAPSULE ORAL at 20:08

## 2025-01-04 RX ADMIN — OXYCODONE HYDROCHLORIDE 10 MG: 10 TABLET ORAL at 03:44

## 2025-01-04 RX ADMIN — HEPARIN SODIUM 5000 UNITS: 5000 INJECTION INTRAVENOUS; SUBCUTANEOUS at 23:10

## 2025-01-04 RX ADMIN — SENNOSIDES AND DOCUSATE SODIUM 2 TABLET: 50; 8.6 TABLET ORAL at 20:08

## 2025-01-04 RX ADMIN — HYDROMORPHONE HYDROCHLORIDE 0.2 MG: 0.2 INJECTION, SOLUTION INTRAMUSCULAR; INTRAVENOUS; SUBCUTANEOUS at 05:39

## 2025-01-04 RX ADMIN — ACETAMINOPHEN 650 MG: 325 TABLET ORAL at 05:42

## 2025-01-04 RX ADMIN — CYCLOBENZAPRINE 10 MG: 10 TABLET, FILM COATED ORAL at 14:04

## 2025-01-04 RX ADMIN — OXYCODONE HYDROCHLORIDE 10 MG: 10 TABLET ORAL at 08:21

## 2025-01-04 RX ADMIN — GABAPENTIN 300 MG: 300 CAPSULE ORAL at 00:32

## 2025-01-04 RX ADMIN — DILTIAZEM HYDROCHLORIDE 240 MG: 240 CAPSULE, COATED, EXTENDED RELEASE ORAL at 08:58

## 2025-01-04 RX ADMIN — ACETAMINOPHEN 650 MG: 325 TABLET ORAL at 11:40

## 2025-01-04 RX ADMIN — ANTACID TABLETS 500 MG: 500 TABLET, CHEWABLE ORAL at 09:08

## 2025-01-04 RX ADMIN — POLYETHYLENE GLYCOL 3350 17 G: 17 POWDER, FOR SOLUTION ORAL at 09:05

## 2025-01-04 RX ADMIN — HEPARIN SODIUM 5000 UNITS: 5000 INJECTION INTRAVENOUS; SUBCUTANEOUS at 05:45

## 2025-01-04 ASSESSMENT — ACTIVITIES OF DAILY LIVING (ADL)
ADL_ASSISTANCE: INDEPENDENT
ADL_ASSISTANCE: INDEPENDENT
BATHING_WHERE_ASSESSED: STANDING SINKSIDE
BATHING_ASSISTANCE: STAND BY
BATHING_LEVEL_OF_ASSISTANCE: CLOSE SUPERVISION
HOME_MANAGEMENT_TIME_ENTRY: 15

## 2025-01-04 ASSESSMENT — COGNITIVE AND FUNCTIONAL STATUS - GENERAL
DAILY ACTIVITIY SCORE: 20
DAILY ACTIVITIY SCORE: 24
WALKING IN HOSPITAL ROOM: A LITTLE
DRESSING REGULAR UPPER BODY CLOTHING: A LITTLE
TOILETING: A LITTLE
CLIMB 3 TO 5 STEPS WITH RAILING: A LITTLE
DRESSING REGULAR LOWER BODY CLOTHING: A LITTLE
HELP NEEDED FOR BATHING: A LITTLE
MOBILITY SCORE: 22
MOBILITY SCORE: 24

## 2025-01-04 ASSESSMENT — PAIN SCALES - GENERAL
PAINLEVEL_OUTOF10: 7
PAINLEVEL_OUTOF10: 9
PAINLEVEL_OUTOF10: 8
PAINLEVEL_OUTOF10: 6
PAINLEVEL_OUTOF10: 8
PAINLEVEL_OUTOF10: 9
PAINLEVEL_OUTOF10: 5 - MODERATE PAIN
PAINLEVEL_OUTOF10: 5 - MODERATE PAIN
PAINLEVEL_OUTOF10: 7
PAINLEVEL_OUTOF10: 7
PAINLEVEL_OUTOF10: 9

## 2025-01-04 ASSESSMENT — PAIN SCALES - PAIN ASSESSMENT IN ADVANCED DEMENTIA (PAINAD): TOTALSCORE: MEDICATION (SEE MAR)

## 2025-01-04 ASSESSMENT — PAIN - FUNCTIONAL ASSESSMENT
PAIN_FUNCTIONAL_ASSESSMENT: 0-10

## 2025-01-04 ASSESSMENT — PAIN SCALES - WONG BAKER
WONGBAKER_NUMERICALRESPONSE: HURTS LITTLE MORE
WONGBAKER_NUMERICALRESPONSE: HURTS WHOLE LOT
WONGBAKER_NUMERICALRESPONSE: HURTS WHOLE LOT
WONGBAKER_NUMERICALRESPONSE: HURTS LITTLE MORE

## 2025-01-04 NOTE — PROGRESS NOTES
"Wilfred Lubin is a 59 y.o. male on day 1 of admission presenting with Low back pain radiating to left lower extremity.    Subjective   No acute events overnight, some back incision pain, improved LLE numbness/pain       Objective     Physical Exam  Breathing well on room air  Aox3  BUE 5/5  RLE 5/5  LLE foot drop/fused ow intact    Last Recorded Vitals  Blood pressure 117/66, pulse 56, temperature 36.5 °C (97.7 °F), temperature source Temporal, resp. rate 20, height 1.88 m (6' 2\"), weight 112 kg (245 lb 13 oz), SpO2 95%.  Intake/Output last 3 Shifts:  I/O last 3 completed shifts:  In: 1290 (11.6 mL/kg) [P.O.:90; I.V.:1200 (10.8 mL/kg)]  Out: 3200 (28.7 mL/kg) [Urine:3000 (0.7 mL/kg/hr); Blood:200]  Weight: 111.5 kg         Assessment/Plan   Assessment & Plan  Low back pain radiating to left lower extremity    Foraminal stenosis of lumbosacral region    Wilfred Lubin is a 59 year old male with h/o CAD on ASA, HTN, chronic LLE pain/foot drop s/p ankle fusion p/w LLE pain, weakness, 1/3 s/p L5-S1 MIS TLIF    PLAN    Floor, uprights in AM (ordered), ASA restart POD7, PTOT           Damian Richard MD      "

## 2025-01-04 NOTE — PROGRESS NOTES
Occupational Therapy    Evaluation and Treatment    Patient Name: Wilfred Lubin  MRN: [id number]  Today's Date: 1/4/2025  Room: [address] [place] [address]  Time Calculation  Start Time: 1016  Stop Time: 1055  Time Calculation (min): 39 min    Assessment  IP OT Assessment  OT Assessment: pt motivated to engage in therapy session. Pt is completing ADL tasks and functional transfers w SBA for balance and safety. Pt demo good adherence to spinal precautions during all ADL tasks and functional  transfers/mobility. Pt does not demonstrate the need for continued OT services and is safe to return home.  Prognosis: Good  Barriers to Discharge Home: No anticipated barriers  Evaluation/Treatment Tolerance: Patient tolerated treatment well  Medical Staff Made Aware: Yes  End of Session Communication: Bedside nurse  End of Session Patient Position: Bed, 2 rail up, Alarm off, not on at start of session  Plan:  Inpatient Plan  No Skilled OT: Safe to return home  OT Discharge Recommendations: No further acute OT, No OT needed after discharge  OT Recommended Transfer Status: Stand by assist  OT - OK to Discharge: Yes  OT Assessment  Prognosis: Good  Evaluation/Treatment Tolerance: Patient tolerated treatment well  Medical Staff Made Aware: Yes  Strengths: Attitude of self, Ability to acquire knowledge    Subjective   Current Problem:  1. Low back pain radiating to left lower extremity  Insert and maintain peripheral IV    Saline lock IV    Type And Screen    povidone-iodine 5 % kit kit    celecoxib (CeleBREX) capsule 400 mg    acetaminophen (Tylenol) tablet 975 mg    tranexamic acid (Lysteda) tablet 1,300 mg    Admit to inpatient    Insert and maintain peripheral IV    Saline lock IV    Admit to inpatient    FL less than 1 hour    FL less than 1 hour    Height and weight    Insert and maintain peripheral IV    Saline lock IV    Type And Screen    Apply sequential compression device    Apply PARVIN hose    povidone-iodine 5 % kit kit    celecoxib  (CeleBREX) capsule 400 mg    acetaminophen (Tylenol) tablet 975 mg    tranexamic acid (Lysteda) tablet 1,300 mg    Full code    Admit to inpatient    Height and weight    Insert and maintain peripheral IV    Saline lock IV    Apply sequential compression device    Apply PARVIN hose    Full code    Admit to inpatient    CANCELED: NPO Diet Except: Sips with meds; Effective now    CANCELED: Height and weight    CANCELED: Apply sequential compression device    CANCELED: Apply PARVIN hose    CANCELED: Full code    CANCELED: NPO Diet Except: Sips with meds; Effective now    CANCELED: Height and weight    CANCELED: Apply sequential compression device    CANCELED: Apply PARVIN hose    CANCELED: Full code    CANCELED: NPO Diet Except: Sips with meds; Effective now    CANCELED: NPO Diet Except: Sips with meds; Effective now      2. Foraminal stenosis of lumbosacral region  Insert and maintain peripheral IV    Saline lock IV    Type And Screen    povidone-iodine 5 % kit kit    celecoxib (CeleBREX) capsule 400 mg    acetaminophen (Tylenol) tablet 975 mg    tranexamic acid (Lysteda) tablet 1,300 mg    Admit to inpatient    Insert and maintain peripheral IV    Saline lock IV    Admit to inpatient    FL less than 1 hour    FL less than 1 hour    Height and weight    Insert and maintain peripheral IV    Saline lock IV    Type And Screen    Apply sequential compression device    Apply PARVIN hose    povidone-iodine 5 % kit kit    celecoxib (CeleBREX) capsule 400 mg    acetaminophen (Tylenol) tablet 975 mg    tranexamic acid (Lysteda) tablet 1,300 mg    Full code    Admit to inpatient    Height and weight    Insert and maintain peripheral IV    Saline lock IV    Apply sequential compression device    Apply PARVIN hose    Full code    Admit to inpatient    CANCELED: NPO Diet Except: Sips with meds; Effective now    CANCELED: Height and weight    CANCELED: Apply sequential compression device    CANCELED: Apply PARVIN hose    CANCELED: Full code     CANCELED: NPO Diet Except: Sips with meds; Effective now    CANCELED: Height and weight    CANCELED: Apply sequential compression device    CANCELED: Apply PARVIN hose    CANCELED: Full code    CANCELED: NPO Diet Except: Sips with meds; Effective now    CANCELED: NPO Diet Except: Sips with meds; Effective now        General:  Reason for Referral: 59 y.o. male s/p L5-S1 TLIF 1/3 with Dr. Aldana  Past Medical History Relevant to Rehab: CAD s/p PTCA, paroxysmal A-fib, dyslipidemia, hypothyroidism, and HTN  Prior to Session Communication: Bedside nurse  Patient Position Received: Bed, 2 rail up, Alarm off, not on at start of session  Family/Caregiver Present: Yes  Caregiver Feedback: spouse present-supportive  General Comment: Pt was supine in bed upon OT arrival. Pt was pleasant and agreeable to participate in therapy session.   Precautions:  Medical Precautions: Fall precautions, Spinal precautions  Post-Surgical Precautions: Spinal precautions  Precautions Comment: Pt educated on spinal precautions- pt verbalized good understanding of precautions.    Pain:  Pain Assessment  Pain Assessment: 0-10  0-10 (Numeric) Pain Score: 5 - Moderate pain  Pain Type: Surgical pain  Pain Location: Back  Pain Interventions: Repositioned  Response to Interventions:  (best at rest)      Objective   Cognition:  Overall Cognitive Status: Within Functional Limits  Arousal/Alertness: Appropriate responses to stimuli  Orientation Level: Oriented X4  Following Commands: Follows all commands and directions without difficulty  Safety Judgment: Good awareness of safety precautions  Insight: Within function limits  Impulsive: Within functional limits  Processing Speed: Within funtional limits           Home Living:  Type of Home: House  Lives With: Spouse  Home Adaptive Equipment:  (adjustable bed; pt reports able to get walker from friends if needed)  Home Layout: One level, Laundry in basement  Home Access: Stairs to enter without  rails  Entrance Stairs-Rails: None  Entrance Stairs-Number of Steps: 1 step to get into home; 2 steps to kitchen entry  Bathroom Shower/Tub: Tub/shower unit  Bathroom Toilet: Handicapped height (raised toilet)  Bathroom Equipment: Shower chair with back  Home Living Comments: Wife is home 24/7   Prior Function:  Level of [place]: Independent with ADLs and functional transfers  Receives Help From: Family (spouse available to assist if needed)  ADL Assistance: Independent  Homemaking Assistance: Independent  Ambulatory Assistance: Independent  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)  Hand Dominance: Right  Prior Function Comments: pt reports no falls  IADL History:  Homemaking Responsibilities:  (pt stated wife completes; laundry in basement but wife completes)  Current License: Yes  Mode of Transportation: Car  Occupation: Full time employment  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  ADL:  Eating Assistance: Independent (anticipated)  Grooming Assistance: Independent  Bathing Assistance: Stand by  Bathing Deficit: Supervision/safety  UE Dressing Assistance: Stand by  UE Dressing Deficit: Supervision/safety  LE Dressing Assistance: Stand by  LE Dressing Deficit: Supervision/safety  Toileting Assistance with Device: Stand by  Toileting Deficit: Supervison/safety  Activity Tolerance:  Endurance: Endurance does not limit participation in activity  Balance:  Dynamic Sitting Balance  Dynamic Sitting-Balance Support: Feet supported, No upper extremity supported  Dynamic Sitting-Level of Assistance: Distant supervision  Dynamic Standing Balance  Dynamic Standing-Balance Support: No upper extremity supported  Dynamic Standing-Level of Assistance: Close supervision  Bed Mobility/Transfers: Bed Mobility/Transfers: Bed Mobility  Bed Mobility: Yes  Bed Mobility 1  Bed Mobility 1: Supine to sitting, Sitting to supine  Level of Assistance 1: Close supervision  Bed Mobility Comments 1: log roll w SBA  for balance and safety  Functional Mobility  Functional Mobility Performed: Yes  Functional Mobility 1  Surface 1: Level tile  Device 1: No device  Assistance 1: Close supervision  Comments 1: pt completed functional mobility from bed<>bathroom in room w/o device and SBA for balance and safety in preparation for future engagement in ADLs/iADLs.   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  Vision: Vision - Basic Assessment  Current Vision: Wears glasses all the time (does not have glasses w him at hospital)    Sensation:  Light Touch: Partial deficits in the LLE (pt reports N/T in LLE)  Sensation Comment: BUE WFL; denies N/T in BUEs  Strength:  Strength Comments: BUE WFL  Perception:  Inattention/Neglect: Appears intact  Coordination:  Movements are Fluid and Coordinated: Yes   Hand Function:  Hand Function  Gross Grasp: Functional  Coordination: Functional  Extremities:   RUE   RUE : Within Functional Limits (5/5), LUE   LUE: Within Functional Limits (5/5)       Outcome Measures: [facility] Daily Activity  Putting on and taking off regular lower body clothing: A little  Bathing (including washing, rinsing, drying): A little  Putting on and taking off regular upper body clothing: A little  Toileting, which includes using toilet, bedpan or urinal: A little  Taking care of personal grooming such as brushing teeth: None  Eating Meals: None  Daily Activity - Total Score: 20      OT Adult Other Outcome Measures  4AT: negative    Education Documentation  Body  Mechanics, taught by Esperanza Rojas OT at 1/4/2025  1:24 PM.  Learner: Patient  Readiness: Acceptance  Method: Explanation  Response: Verbalizes Understanding    Precautions, taught by Esperanza Rojas OT at 1/4/2025  1:24 PM.  Learner: Patient  Readiness: Acceptance  Method: Explanation  Response: Verbalizes Understanding    ADL Training, taught by Esperanza Rojas OT at 1/4/2025  1:24 PM.  Learner: Patient  Readiness: Acceptance  Method: Explanation  Response: Verbalizes Understanding    Education Comments  No comments found.      Treatment Completed on Evaluation    Activities of Daily Living:    Grooming  Grooming Level of Assistance: Close supervision  Grooming Where Assessed: Standing sinkside  Grooming Comments: pt completed washing face, brushing teeth, and washing hands standing at sink w SBA for balance and safety  UE Bathing  UE Bathing Level of Assistance: Close supervision  UE Bathing Where Assessed: Standing sinkside  UE Bathing Comments: pt completed UB bathing standing at sinkside w SBA for balance and safety  LE Bathing  LE Bathing Level of Assistance: Close supervision  LE Bathing Where Assessed: Standing sinkside  LE Bathing Comments: pt completed LB bathing (washing [place] area, upper legs) standing at sinkside w SBA for balance and safety; pt w adherance of spinal precautions throughout task  UE Dressing  UE Dressing Level of Assistance: Close supervision  UE Dressing Where Assessed:  (standing at sink)  UE Dressing Comments: SBA don/doff gown standing at sink  LE Dressing  LE Dressing: Yes  LE Dressing Where Assessed: Edge of bed  LE Dressing Comments: SBA don underwear sitting at EOB  Toileting  Toileting Level of Assistance: Close supervision  Where Assessed: Toilet  Toileting Comments: pt completed toileting tasks standing at toilet w SBA for balance and safety         Therapy/Activity:     Therapeutic Activity  Therapeutic Activity Performed: Yes  Therapeutic Activity 1: Educated pt on spinal  precautions- pt verbalized good understanding.  Therapeutic Activity 2: Educated pt on AE and compensatory strategies to complete ADL tasks and functional transfers/mobility while adhering to spinal precautions- pt verbalized good understanding.  Therapeutic Activity 3: pt completed 2 sit<>stand transfers w SBA for balance and safety. Pt completed functional mobility from bed<>bathroom in room w/o device and SBA for balance and safety in preparation for future engagement in ADLs/iADLs. pt tolerated standing at sink >10mins w SBA for balance and safety to complete ADL tasks.         01/04/25 at 3:52 PM   JOSIE ELLISON OT   Rehab Office: [phone]

## 2025-01-04 NOTE — PROGRESS NOTES
Physical Therapy    Physical Therapy Evaluation & Treatment    Patient Name: Wilfred Lubin  MRN: [id number]  Department: [place] [id number]  Room: [address] [place] [address]  Today's Date: 1/4/2025   Time Calculation  Start Time: 1101  Stop Time: 1126  Time Calculation (min): 25 min    Assessment/Plan   PT Assessment  PT Assessment Results: Decreased endurance, Impaired balance, Decreased mobility  Rehab Prognosis: Good  Evaluation/Treatment Tolerance: Patient tolerated treatment well  Medical Staff Made Aware: Yes  Strengths: Ability to acquire knowledge, Attitude of self, Support and attitude of living partners  End of Session Communication: Bedside nurse, Physician  Assessment Comment: Pt with good tolerance to activity. Pt was able to abulate 300ft and perform 4 steps with SBA. Pt steady and with 0 LOB. Patient would benefit from skilled physical therapy to maximize functional mobility and safety. Pt has no PT needs when medically appropriate for DC.  End of Session Patient Position: Bed, 2 rail up, Alarm off, not on at start of session (CB in reach, spouse present)   IP OR SWING BED PT PLAN  Inpatient or Swing Bed: Inpatient  PT Plan  Treatment/Interventions: Bed mobility, Transfer training, Stair training, Gait training, Balance training, Strengthening, Endurance training, Therapeutic exercise, Therapeutic activity, Home exercise program  PT Plan: Ongoing PT  PT Frequency: Daily  PT Discharge Recommendations: No PT needed after discharge  Equipment Recommended upon Discharge:  (none)  PT Recommended Transfer Status: Assist x1, Contact guard  PT - OK to Discharge: Yes (meaning pt seen and dc rec made)  RN cleared prior to session     Subjective   General Visit Information:  General  Reason for Referral: 59 y.o.  male s/p  L5-S1 TLIF 1/3 with Dr. Aldana  Past Medical History Relevant to Rehab: CAD s/p PTCA, paroxysmal A-fib, dyslipidemia, hypothyroidism, and HTN  Family/Caregiver Present: Yes  Caregiver Feedback: spouse present and  supportive  Prior to Session Communication: Bedside nurse, Physician  Patient Position Received: Bed, 2 rail up, Alarm off, not on at start of session  Preferred Learning Style: auditory, verbal  General Comment: Pt pleasant and agreeable to therapy  Home Living:  Home Living  Type of Home: House  Lives With: Spouse  Home Adaptive Equipment:  (adjustable bed)  Home Layout: One level, Laundry in basement, Able to live on main level with bedroom/bathroom  Home Access: Stairs to enter without rails  Entrance Stairs-Rails: None  Entrance Stairs-Number of Steps: 1 + 2  Bathroom Shower/Tub: Tub/shower unit  Bathroom Toilet:  (raised toilet)  Bathroom Equipment: Shower chair with back  Home Living Comments: Wife is home 24/7  Prior Level of Function:  Prior Function Per Pt/Caregiver Report  Level of [place]: Independent with ADLs and functional transfers, Independent with homemaking with ambulation  ADL Assistance: Independent  Homemaking Assistance: Independent  Ambulatory Assistance: Independent  Prior Function Comments: Pt was IND in all ADLs and IADLs prior to admit and used no AD for ambulation. Pt reports no falls.  Precautions:  Precautions  Medical Precautions: Fall precautions, Spinal precautions  Post-Surgical Precautions: Spinal precautions  Precautions Comment: Pt with good adherence to precautions during session    Objective   Pain:  Pain Assessment  Pain Assessment: 0-10  0-10 (Numeric) Pain Score:  (pt did not rate)  Pain Type: Surgical pain  Pain Location: Back  Pain Interventions: Repositioned, Ambulation/increased activity (RN pre medicated)  Cognition:  Cognition  Overall Cognitive Status: Within Functional Limits  Arousal/Alertness: Appropriate responses to stimuli  Orientation Level: Oriented X4  Following Commands: Follows all commands and directions without difficulty    General Assessments:  Activity Tolerance  Endurance: Endurance does not limit participation in activity    Sensation  Light  Touch: Partial deficits in the LLE    Strength  Strength Comments: BLE grossly >/= 3+/5  Coordination  Movements are Fluid and Coordinated: Yes    Postural Control  Postural Control: Within Functional Limits    Static Sitting Balance  Static Sitting-Balance Support: Bilateral upper extremity supported, Feet supported  Static Sitting-Level of Assistance: Distant supervision    Static Standing Balance  Static Standing-Balance Support: No upper extremity supported  Static Standing-Level of Assistance: Close supervision  Functional Assessments:  Bed Mobility  Bed Mobility: Yes  Bed Mobility 1  Bed Mobility 1: Supine to sitting, Sitting to supine  Level of Assistance 1: Close supervision  Bed Mobility Comments 1: x 2 trials; min vc for log roll    Transfers  Transfer: Yes  Transfer 1  Transfer From 1: Sit to, Stand to  Transfer to 1: Stand, Sit  Technique 1: Sit to stand, Stand to sit  Transfer Device 1:  (none)  Transfer Level of Assistance 1: Contact guard, Close supervision  Trials/Comments 1: 2 trials; CGA from low surface; min vc for safety    Ambulation/Gait Training  Ambulation/Gait Training Performed: Yes  Ambulation/Gait Training 1  Surface 1: Level tile  Device 1: No device  Assistance 1: Contact guard (SBA)  Quality of Gait 1: Inconsistent stride length, Decreased step length (decreased foot clearance and initially antalgic on LLE but corrected in 5ft of ambulation)  Comments/Distance (ft) 1: 60ft, 300ft; min vc for safety; initialy CGA but progressing to SBA.  Extremity/Trunk Assessments:  RLE   RLE :  (grossly >/=3+/5)  LLE   LLE :  (grossly >/=3+/5)  Treatments:  2nd trial of ambulation listed above    Stairs  Stairs: Yes  Stairs  Rails 1: Right  Device 1: Railing  Assistance 1:  (SBA)  Comment/Number of Steps 1: 4 steps; min vc for sequencing and safety  Outcome Measures:  [facility] Basic Mobility  Turning from your back to your side while in a flat bed without using bedrails: None  Moving from lying on your  back to sitting on the side of a flat bed without using bedrails: None  Moving to and from bed to chair (including a wheelchair): None  Standing up from a chair using your arms (e.g. wheelchair or bedside chair): None  To walk in hospital room: A little  Climbing 3-5 steps with railing: A little  Basic Mobility - Total Score: 22    Encounter Problems       Encounter Problems (Active)       PT Problem       Patient will complete supine to sit and sit to supine Independent        Start:  01/04/25    Expected End:  01/17/25            Patient will perform sit<>stand transfer with No Device, and Independent        Start:  01/04/25    Expected End:  01/17/25            Patient will ambulate >250' with no device and Independent       Start:  01/04/25    Expected End:  01/17/25            Patient will ascend/descend 3 steps with No Rails and independence        Start:  01/04/25    Expected End:  01/17/25                   Education Documentation  Precautions, taught by Jenn Olvera PT at 1/4/2025 12:43 PM.  Learner: Patient  Readiness: Acceptance  Method: Explanation, Demonstration  Response: Verbalizes Understanding, Needs Reinforcement    Body Mechanics, taught by Jenn Olvera PT at 1/4/2025 12:43 PM.  Learner: Patient  Readiness: Acceptance  Method: Explanation, Demonstration  Response: Verbalizes Understanding, Needs Reinforcement    Home Exercise Program, taught by Jenn Olvera PT at 1/4/2025 12:43 PM.  Learner: Patient  Readiness: Acceptance  Method: Explanation, Demonstration  Response: Verbalizes Understanding, Needs Reinforcement    Mobility Training, taught by Jenn Olvera PT at 1/4/2025 12:43 PM.  Learner: Patient  Readiness: Acceptance  Method: Explanation, Demonstration  Response: Verbalizes Understanding, Needs Reinforcement    Education Comments  No comments found.

## 2025-01-04 NOTE — CARE PLAN
The patient's goals for the shift include      The clinical goals for the shift include patient will work with therapies,remain free from surgical site pain.    Over the shift, the patient make progress toward the following goals. Barriers to progression include Pain at surgical site. Recommendations to address these barriers include pharmacological and non pharmacological interventions.

## 2025-01-05 VITALS
WEIGHT: 245.81 LBS | DIASTOLIC BLOOD PRESSURE: 71 MMHG | BODY MASS INDEX: 31.55 KG/M2 | OXYGEN SATURATION: 95 % | RESPIRATION RATE: 16 BRPM | SYSTOLIC BLOOD PRESSURE: 120 MMHG | HEIGHT: 74 IN | TEMPERATURE: 96.8 F | HEART RATE: 54 BPM

## 2025-01-05 LAB
ANION GAP SERPL CALC-SCNC: 11 MMOL/L (ref 10–20)
BUN SERPL-MCNC: 15 MG/DL (ref 6–23)
CALCIUM SERPL-MCNC: 8.8 MG/DL (ref 8.6–10.6)
CHLORIDE SERPL-SCNC: 99 MMOL/L (ref 98–107)
CO2 SERPL-SCNC: 30 MMOL/L (ref 21–32)
CREAT SERPL-MCNC: 0.9 MG/DL (ref 0.5–1.3)
EGFRCR SERPLBLD CKD-EPI 2021: >90 ML/MIN/1.73M*2
ERYTHROCYTE [DISTWIDTH] IN BLOOD BY AUTOMATED COUNT: 11.8 % (ref 11.5–14.5)
GLUCOSE SERPL-MCNC: 101 MG/DL (ref 74–99)
HCT VFR BLD AUTO: 35.2 % (ref 41–52)
HGB BLD-MCNC: 12 G/DL (ref 13.5–17.5)
MCH RBC QN AUTO: 31.7 PG (ref 26–34)
MCHC RBC AUTO-ENTMCNC: 34.1 G/DL (ref 32–36)
MCV RBC AUTO: 93 FL (ref 80–100)
NRBC BLD-RTO: 0 /100 WBCS (ref 0–0)
PLATELET # BLD AUTO: 135 X10*3/UL (ref 150–450)
POTASSIUM SERPL-SCNC: 3.9 MMOL/L (ref 3.5–5.3)
RBC # BLD AUTO: 3.79 X10*6/UL (ref 4.5–5.9)
SODIUM SERPL-SCNC: 136 MMOL/L (ref 136–145)
WBC # BLD AUTO: 8.5 X10*3/UL (ref 4.4–11.3)

## 2025-01-05 PROCEDURE — 85027 COMPLETE CBC AUTOMATED: CPT | Performed by: STUDENT IN AN ORGANIZED HEALTH CARE EDUCATION/TRAINING PROGRAM

## 2025-01-05 PROCEDURE — 2500000004 HC RX 250 GENERAL PHARMACY W/ HCPCS (ALT 636 FOR OP/ED): Performed by: STUDENT IN AN ORGANIZED HEALTH CARE EDUCATION/TRAINING PROGRAM

## 2025-01-05 PROCEDURE — 36415 COLL VENOUS BLD VENIPUNCTURE: CPT | Performed by: STUDENT IN AN ORGANIZED HEALTH CARE EDUCATION/TRAINING PROGRAM

## 2025-01-05 PROCEDURE — 2500000005 HC RX 250 GENERAL PHARMACY W/O HCPCS

## 2025-01-05 PROCEDURE — 80048 BASIC METABOLIC PNL TOTAL CA: CPT | Performed by: STUDENT IN AN ORGANIZED HEALTH CARE EDUCATION/TRAINING PROGRAM

## 2025-01-05 PROCEDURE — 2500000001 HC RX 250 WO HCPCS SELF ADMINISTERED DRUGS (ALT 637 FOR MEDICARE OP)

## 2025-01-05 PROCEDURE — 2500000002 HC RX 250 W HCPCS SELF ADMINISTERED DRUGS (ALT 637 FOR MEDICARE OP, ALT 636 FOR OP/ED): Performed by: STUDENT IN AN ORGANIZED HEALTH CARE EDUCATION/TRAINING PROGRAM

## 2025-01-05 PROCEDURE — 2500000001 HC RX 250 WO HCPCS SELF ADMINISTERED DRUGS (ALT 637 FOR MEDICARE OP): Performed by: STUDENT IN AN ORGANIZED HEALTH CARE EDUCATION/TRAINING PROGRAM

## 2025-01-05 PROCEDURE — 2500000004 HC RX 250 GENERAL PHARMACY W/ HCPCS (ALT 636 FOR OP/ED)

## 2025-01-05 RX ORDER — LIDOCAINE 560 MG/1
2 PATCH PERCUTANEOUS; TOPICAL; TRANSDERMAL DAILY
Status: DISCONTINUED | OUTPATIENT
Start: 2025-01-05 | End: 2025-01-05 | Stop reason: HOSPADM

## 2025-01-05 RX ORDER — CYCLOBENZAPRINE HCL 10 MG
10 TABLET ORAL 3 TIMES DAILY
Qty: 21 TABLET | Refills: 0 | Status: SHIPPED | OUTPATIENT
Start: 2025-01-05 | End: 2025-01-12

## 2025-01-05 RX ORDER — AMOXICILLIN 250 MG
2 CAPSULE ORAL 2 TIMES DAILY
Qty: 28 TABLET | Refills: 0 | Status: SHIPPED | OUTPATIENT
Start: 2025-01-05 | End: 2025-01-12

## 2025-01-05 RX ORDER — ASPIRIN 81 MG/1
81 TABLET ORAL DAILY
Qty: 30 TABLET | Refills: 0 | Status: SHIPPED | OUTPATIENT
Start: 2025-01-10 | End: 2025-02-09

## 2025-01-05 RX ORDER — OXYCODONE HYDROCHLORIDE 5 MG/1
5 TABLET ORAL EVERY 4 HOURS PRN
Qty: 15 TABLET | Refills: 0 | Status: SHIPPED | OUTPATIENT
Start: 2025-01-05 | End: 2025-01-10 | Stop reason: SDUPTHER

## 2025-01-05 RX ORDER — LIDOCAINE 560 MG/1
2 PATCH PERCUTANEOUS; TOPICAL; TRANSDERMAL DAILY
Qty: 14 PATCH | Refills: 0 | Status: SHIPPED | OUTPATIENT
Start: 2025-01-05 | End: 2025-01-12

## 2025-01-05 RX ORDER — ACETAMINOPHEN 325 MG/1
650 TABLET ORAL EVERY 6 HOURS
Qty: 56 TABLET | Refills: 0 | Status: SHIPPED | OUTPATIENT
Start: 2025-01-05 | End: 2025-01-12

## 2025-01-05 RX ADMIN — DILTIAZEM HYDROCHLORIDE 240 MG: 240 CAPSULE, COATED, EXTENDED RELEASE ORAL at 08:02

## 2025-01-05 RX ADMIN — OXYCODONE HYDROCHLORIDE 10 MG: 10 TABLET ORAL at 04:38

## 2025-01-05 RX ADMIN — CYCLOBENZAPRINE 10 MG: 10 TABLET, FILM COATED ORAL at 08:02

## 2025-01-05 RX ADMIN — OXYCODONE HYDROCHLORIDE 10 MG: 10 TABLET ORAL at 09:39

## 2025-01-05 RX ADMIN — GABAPENTIN 600 MG: 300 CAPSULE ORAL at 08:02

## 2025-01-05 RX ADMIN — METOPROLOL TARTRATE 50 MG: 50 TABLET, FILM COATED ORAL at 08:02

## 2025-01-05 RX ADMIN — LEVOTHYROXINE SODIUM 88 MCG: 0.09 TABLET ORAL at 04:39

## 2025-01-05 RX ADMIN — ACETAMINOPHEN 650 MG: 325 TABLET ORAL at 04:38

## 2025-01-05 RX ADMIN — OXYCODONE HYDROCHLORIDE 10 MG: 10 TABLET ORAL at 00:38

## 2025-01-05 RX ADMIN — SENNOSIDES AND DOCUSATE SODIUM 2 TABLET: 50; 8.6 TABLET ORAL at 08:02

## 2025-01-05 RX ADMIN — HEPARIN SODIUM 5000 UNITS: 5000 INJECTION INTRAVENOUS; SUBCUTANEOUS at 08:04

## 2025-01-05 RX ADMIN — LIDOCAINE 2 PATCH: 4 PATCH TOPICAL at 09:39

## 2025-01-05 RX ADMIN — ANTACID TABLETS 500 MG: 500 TABLET, CHEWABLE ORAL at 08:07

## 2025-01-05 RX ADMIN — POLYETHYLENE GLYCOL 3350 17 G: 17 POWDER, FOR SOLUTION ORAL at 08:07

## 2025-01-05 RX ADMIN — MAGNESIUM OXIDE TAB 400 MG (241.3 MG ELEMENTAL MG) 400 MG: 400 (241.3 MG) TAB at 04:38

## 2025-01-05 ASSESSMENT — PAIN SCALES - WONG BAKER: WONGBAKER_NUMERICALRESPONSE: HURTS WHOLE LOT

## 2025-01-05 ASSESSMENT — PAIN SCALES - GENERAL: PAINLEVEL_OUTOF10: 8

## 2025-01-05 NOTE — DISCHARGE SUMMARY
Discharge Diagnosis  Low back pain radiating to left lower extremity    Issues Requiring Follow-Up  Left foot drop and spine surgery    Test Results Pending At Discharge  Pending Labs       Order Current Status    Basic metabolic panel In process            Hospital Course  Past Medical History:   Diagnosis Date    Arrhythmia     Chronic pain disorder     Coronary artery disease     Hypertension     Myocardial infarction (Multi)          Mr. Lubin is a 52 yo male with history of CAD on ASA, HTN, chronic LLE pain/foot drop s/p ankle fusion p/w LLE pain, weakness, 1/3 s/p L5-S1 MIS TLIF  After surgery patient was doing well with improved LLE pain. PTOT evaluated and determined patient to have no additional needs.     On day of discharge, patient was doing well and medically ready.     Pertinent Physical Exam At Time of Discharge  Physical Exam  Exam:  Constitutional: No acute distress  Resp: breathing comfortably  Cardio: well perfused  GI: nondistended  MSK: full range of motion  Neuro: Awake, A&Ox3  Cranial Nerves II-XII: PERRL, EOMI, Face symmetric, Facial SILT, Palate/Tongue midline and symmetric, shoulder shrugs symmetric, hearing intact to finger rubs bilaterally  BUE 5/5  RLE 5/5  LLE foot drop/fused ow intact    Sensation: SILT throughout all extremities  DTRS: 2+ Throughout, No Hoffmans or Clonus  Psych: appropriate  Skin: no obvious lesions        Home Medications     Medication List      START taking these medications     acetaminophen 325 mg tablet; Commonly known as: Tylenol; Take 2 tablets   (650 mg) by mouth every 6 hours for 7 days.   cyclobenzaprine 10 mg tablet; Commonly known as: Flexeril; Take 1 tablet   (10 mg) by mouth 3 times a day for 7 days.   lidocaine 4 % patch; Place 2 patches over 12 hours on the skin once   daily for 7 days. Remove & discard patch within 12 hours or as directed by   MD.   oxyCODONE 5 mg immediate release tablet; Commonly known as: Roxicodone;   Take 1 tablet (5 mg) by  mouth every 4 hours if needed for severe pain (7 -   10) for up to 7 days.   sennosides-docusate sodium 8.6-50 mg tablet; Commonly known as:   Brandi-Colace; Take 2 tablets by mouth 2 times a day for 7 days.     CHANGE how you take these medications     aspirin 81 mg EC tablet; Take 1 tablet (81 mg) by mouth once daily. Do   not fill before January 10, 2025.; Start taking on: January 10, 2025; What   changed: These instructions start on January 10, 2025. If you are unsure   what to do until then, ask your doctor or other care provider.     CONTINUE taking these medications     atorvastatin 40 mg tablet; Commonly known as: Lipitor   baclofen 5 mg tablet; Commonly known as: Lioresal   dilTIAZem  mg 24 hr capsule; Commonly known as: Cardizem CD   gabapentin 300 mg capsule; Commonly known as: Neurontin   levothyroxine 88 mcg tablet; Commonly known as: Synthroid, Levoxyl   magnesium oxide 400 mg (241.3 mg magnesium) tablet; Commonly known as:   Mag-Ox   meloxicam 15 mg tablet; Commonly known as: Mobic   metoprolol tartrate 50 mg tablet; Commonly known as: Lopressor   polyethylene glycol 17 gram/dose powder; Commonly known as: Glycolax,   Miralax     STOP taking these medications     chlorhexidine 4 % external liquid; Commonly known as: Hibiclens       Outpatient Follow-Up  Future Appointments   Date Time Provider Department Center   1/30/2025 11:00 AM Gallo Aldana MD PhD [id number] [facility]   4/3/2025 11:30 AM Gallo Aldana MD PhD [id number] [facility]       Stuart Baldwin MD

## 2025-01-05 NOTE — PROGRESS NOTES
"Wilfred Lubin is a 59 y.o. male on day 2 of admission presenting with Low back pain radiating to left lower extremity.    Subjective   No acute events overnight, moderate pain but is controlled, improved symptoms, able to walk straight unlike before, some residual numbness remains       Objective     Physical Exam  Breathing well on room air  Aox3  BUE 5/5  RLE 5/5  LLE foot drop/fused ow intact    Last Recorded Vitals  Blood pressure 126/72, pulse 52, temperature 36.8 °C (98.2 °F), temperature source Temporal, resp. rate 18, height 1.88 m (6' 2\"), weight 112 kg (245 lb 13 oz), SpO2 92%.  Intake/Output last 3 Shifts:  I/O last 3 completed shifts:  In: 1290 (11.6 mL/kg) [P.O.:90; I.V.:1200 (10.8 mL/kg)]  Out: 3200 (28.7 mL/kg) [Urine:3000 (0.7 mL/kg/hr); Blood:200]  Weight: 111.5 kg         Assessment/Plan   Assessment & Plan  Low back pain radiating to left lower extremity    Foraminal stenosis of lumbosacral region    Wilfred Lubin is a 59 year old male with h/o CAD on ASA, HTN, chronic LLE pain/foot drop s/p ankle fusion p/w LLE pain, weakness, 1/3 s/p L5-S1 MIS TLIF, 1/4 uprights hardware in pos'n    PLAN    Floor,   ASA restart POD7,   PTOT-NN  Dispo today           Damian Richard MD      "

## 2025-01-08 NOTE — ANESTHESIA POSTPROCEDURE EVALUATION
Patient: Wilfred Lubin    Procedure Summary       Date: 01/03/25 Room / Location: [facility] OR 23 / [facility]    Anesthesia Start: 0735 Anesthesia Stop: 1313    Procedure: L5-S1 Robotic-assisted minimally invasive transforaminal lumbar interbody fusion Diagnosis:       Low back pain radiating to left lower extremity      Foraminal stenosis of lumbosacral region      (Low back pain radiating to left lower extremity [M54.50, M79.605])      (Foraminal stenosis of lumbosacral region [M48.07])    Surgeons: Gallo Aldana MD PhD Responsible Provider: Lars Gardner MD    Anesthesia Type: general ASA Status: 3            Anesthesia Type: general    Vitals Value Taken Time   /82 01/03/25 1545   Temp 36 °C (96.8 °F) 01/03/25 1545   Pulse 63 01/03/25 1545   Resp 12 01/03/25 1545   SpO2 96 % 01/03/25 1545       Anesthesia Post Evaluation    Patient location during evaluation: PACU  Patient participation: complete - patient participated  Level of consciousness: awake and alert  Pain management: adequate  Multimodal analgesia pain management approach  Airway patency: patent  Two or more strategies used to mitigate risk of obstructive sleep apnea  Cardiovascular status: acceptable and stable  Respiratory status: acceptable and room air  Hydration status: acceptable  Postoperative Nausea and Vomiting: none  Comments: Delayed entry of note. Patient was seen and evaluated by anesthesia team including Dr. Gardner during his time in PACU. Patient met above criteria before being discharged from recovery to floor per  protocols.     There were no known notable events for this encounter.

## 2025-01-10 ENCOUNTER — TELEPHONE (OUTPATIENT)
Dept: NEUROSURGERY | Facility: CLINIC | Age: 60
End: 2025-01-10
Payer: COMMERCIAL

## 2025-01-10 DIAGNOSIS — M54.50 LOW BACK PAIN RADIATING TO LEFT LOWER EXTREMITY: ICD-10-CM

## 2025-01-10 DIAGNOSIS — M79.605 LOW BACK PAIN RADIATING TO LEFT LOWER EXTREMITY: ICD-10-CM

## 2025-01-10 DIAGNOSIS — M48.07 FORAMINAL STENOSIS OF LUMBOSACRAL REGION: ICD-10-CM

## 2025-01-10 RX ORDER — OXYCODONE HYDROCHLORIDE 5 MG/1
5 TABLET ORAL EVERY 6 HOURS PRN
Qty: 28 TABLET | Refills: 0 | Status: SHIPPED | OUTPATIENT
Start: 2025-01-12 | End: 2025-01-19

## 2025-01-10 NOTE — PROGRESS NOTES
Refill provided for Oxycodone to be filled 1/12/25    I have personally reviewed the OARRS report. This report is scanned into the electronic medical record. I have considered the risks of abuse, dependence, addiction and diversion.     Patient with recent history of major reconstructive surgery necessitating narcotic medications at higher doses during the post-operative period of 6 weeks.     Will continue with close monitoring and short course refills.

## 2025-01-30 ENCOUNTER — OFFICE VISIT (OUTPATIENT)
Dept: NEUROSURGERY | Facility: HOSPITAL | Age: 60
End: 2025-01-30
Payer: COMMERCIAL

## 2025-01-30 VITALS
RESPIRATION RATE: 18 BRPM | DIASTOLIC BLOOD PRESSURE: 74 MMHG | HEART RATE: 50 BPM | BODY MASS INDEX: 31.46 KG/M2 | SYSTOLIC BLOOD PRESSURE: 123 MMHG | WEIGHT: 245 LBS

## 2025-01-30 DIAGNOSIS — M79.605 LOW BACK PAIN RADIATING TO LEFT LOWER EXTREMITY: ICD-10-CM

## 2025-01-30 DIAGNOSIS — M54.50 LOW BACK PAIN RADIATING TO LEFT LOWER EXTREMITY: ICD-10-CM

## 2025-01-30 DIAGNOSIS — M48.07 FORAMINAL STENOSIS OF LUMBOSACRAL REGION: ICD-10-CM

## 2025-01-30 PROCEDURE — 99211 OFF/OP EST MAY X REQ PHY/QHP: CPT | Performed by: STUDENT IN AN ORGANIZED HEALTH CARE EDUCATION/TRAINING PROGRAM

## 2025-01-30 RX ORDER — CYCLOBENZAPRINE HCL 10 MG
10 TABLET ORAL 3 TIMES DAILY
Qty: 21 TABLET | Refills: 0 | Status: SHIPPED | OUTPATIENT
Start: 2025-01-30 | End: 2025-02-06

## 2025-01-30 ASSESSMENT — PAIN SCALES - GENERAL: PAINLEVEL_OUTOF10: 8

## 2025-01-30 NOTE — PROGRESS NOTES
[facility] [place]  Department of Neurological Surgery  Post Operative Patient Visit      History of Present Illness:  Wilfred Lubin is a 60 y.o. year old male who presents to the spine clinic in a post operative visit. Since surgery they are       Kishor 3     Left foot fusion     L5-S1 TLIF       No longer walking sideways     Foot ankle pain , independent of back     Getting some more sensation     Foto ankle arpan     Still has some what feels to be surgical back pain.        The above clinical summary has been dictated with voice recognition software. It has not been proofread for grammatical errors, typographical mistakes, or other semantic inconsistencies.    Thank you for visiting our office today. It was our pleasure to take part in your healthcare.     Do not hesitate to call with any questions regarding your plan of care after leaving at [phone] M-F 8am-4pm.     To clinicians, thank you very much for this kind referral. It is a privilege to partner with you in the care of your patients. My office would be delighted to assist you with any further consultations or with questions regarding the plan of care outlined. Do not hesitate to call the office or contact me directly.       Sincerely,      Gallo Aldana MD, PhD  Attending Neurosurgeon, [facility]   of Neurological Surgery  [facility]  Office: [phone]  Fax: [phone]    [facility]  [address] [place] [address]  [address] [address]               My office would be delighted to assist you with any further consultations or with questions regarding the plan of care outlined. Do not hesitate to call the office or contact me directly.       Sincerely,      Gallo Aldana MD, PhD  Attending Neurosurgeon, [facility]   of Neurological Surgery  [facility]  Office: [phone]  Fax: [phone]    [facility]  [address] [place] [address]  [address] [address]

## 2025-01-30 NOTE — LETTER
January 30, 2025     Patient: Wilfred Lubin   YOB: 1965   Date of Visit: 1/30/2025       To Whom It May Concern:    It is my medical opinion that Wilfred Lubin may return to work on Monday 2/17/25 with No Restrictions     If you have any questions or concerns, please don't hesitate to call.         Sincerely,        Gallo Aldana MD PhD

## 2025-04-03 ENCOUNTER — APPOINTMENT (OUTPATIENT)
Dept: NEUROSURGERY | Facility: HOSPITAL | Age: 60
End: 2025-04-03
Payer: COMMERCIAL

## 2025-04-08 ENCOUNTER — APPOINTMENT (OUTPATIENT)
Dept: NEUROSURGERY | Facility: CLINIC | Age: 60
End: 2025-04-08
Payer: COMMERCIAL

## 2025-04-10 ENCOUNTER — OFFICE VISIT (OUTPATIENT)
Dept: NEUROSURGERY | Facility: HOSPITAL | Age: 60
End: 2025-04-10
Payer: COMMERCIAL

## 2025-04-10 VITALS
SYSTOLIC BLOOD PRESSURE: 122 MMHG | RESPIRATION RATE: 17 BRPM | HEIGHT: 74 IN | WEIGHT: 245 LBS | HEART RATE: 55 BPM | DIASTOLIC BLOOD PRESSURE: 75 MMHG | BODY MASS INDEX: 31.44 KG/M2

## 2025-04-10 DIAGNOSIS — M79.605 LOW BACK PAIN RADIATING TO LEFT LOWER EXTREMITY: Primary | ICD-10-CM

## 2025-04-10 DIAGNOSIS — M54.50 LOW BACK PAIN RADIATING TO LEFT LOWER EXTREMITY: Primary | ICD-10-CM

## 2025-04-10 DIAGNOSIS — M48.07 FORAMINAL STENOSIS OF LUMBOSACRAL REGION: ICD-10-CM

## 2025-04-10 PROCEDURE — 3008F BODY MASS INDEX DOCD: CPT | Performed by: STUDENT IN AN ORGANIZED HEALTH CARE EDUCATION/TRAINING PROGRAM

## 2025-04-10 PROCEDURE — 99211 OFF/OP EST MAY X REQ PHY/QHP: CPT | Performed by: STUDENT IN AN ORGANIZED HEALTH CARE EDUCATION/TRAINING PROGRAM

## 2025-04-10 NOTE — PROGRESS NOTES
[facility] [place]  Department of Neurological Surgery  Post Operative Patient Visit      History of Present Illness:  Wilfred Lubin is a 60 y.o. year old male who presented with low back and left leg radiculopathy and had a LEFT L5-S1 ROBOTIC TLIF on 1/3/25 at [facility].     Pre-Op Visit 12/12/24  Wilfred Lubin is a 59 y.o. year old male who presents to the spine clinic with lower back and left leg radiculopathy.  He has found some relief with injections, but they have had diminished returns.  He has severe LEFT L5-S1 foraminal stenosis with endplate changes at that level.  His EMG supports pathology at the L5-S1 level.  He has a left ankle fusion, which confounds the exam, but his symptoms and imaging findings and failure of longstanding conservative management makes surgery a reasonable option.  He states he cannot tolerate this pain any further. I recommend a Robotic, Minimally Invasive LEFT TLIF to both directly and indirectly  decompress the left L5 nerve root, followed by stabilization.        Post-Op Visit 1/30/25  Wilfred is doing well post op.  He is still having some surgical site pain.  He no longer hs his abnormal sideways gait and he is getting more sensation in his leg.  He is having some non-related ankle pain that he will follow up with his foot doctor. His incisions are healing well.    I will see him for a 3 month follow-up.  I ordered him some flexeril and recommended PT.    Today,   Wilfred is very happy with his surgery.  His awkward gait and LLE radiculopathy has resolved. His surgical site pain has resolved. His isolated left ankle pain is likely due to arthritis and previous surgery. His incisions are well healed. He was intact in exam, aside from his left ankle fusion.     He also complained of some neck pain and left arm pain/numbness.  He had a POS Spurlings sign on the left, but otherwise intact. His symptoms are relatively mild at this time and I did not recommend further  imaging, rather a trial of physical therapy for the neck.     Wilfred does need another scheduled visit, but should reach out with any symptoms or concerns.    The above clinical summary has been dictated with voice recognition software. It has not been proofread for grammatical errors, typographical mistakes, or other semantic inconsistencies.    Thank you for visiting our office today. It was our pleasure to take part in your healthcare.     Do not hesitate to call with any questions regarding your plan of care after leaving at [phone] M-F 8am-4pm.     To clinicians, thank you very much for this kind referral. It is a privilege to partner with you in the care of your patients. My office would be delighted to assist you with any further consultations or with questions regarding the plan of care outlined. Do not hesitate to call the office or contact me directly.       Sincerely,      Gallo Aldana MD, PhD  Attending Neurosurgeon, [facility]   of Neurological Surgery  [facility]  Office: [phone]  Fax: [phone]    [facility]  [address]  [address] [address]

## 2025-05-06 ENCOUNTER — APPOINTMENT (OUTPATIENT)
Dept: OPHTHALMOLOGY | Facility: CLINIC | Age: 60
End: 2025-05-06
Payer: COMMERCIAL

## 2025-07-07 ENCOUNTER — APPOINTMENT (OUTPATIENT)
Dept: OPHTHALMOLOGY | Facility: CLINIC | Age: 60
End: 2025-07-07
Payer: COMMERCIAL

## 2025-07-22 PROCEDURE — 99284 EMERGENCY DEPT VISIT MOD MDM: CPT | Performed by: EMERGENCY MEDICINE

## 2025-07-22 ASSESSMENT — PAIN DESCRIPTION - PAIN TYPE: TYPE: ACUTE PAIN

## 2025-07-22 ASSESSMENT — PAIN DESCRIPTION - DESCRIPTORS: DESCRIPTORS: SPASM

## 2025-07-22 ASSESSMENT — PAIN SCALES - GENERAL: PAINLEVEL_OUTOF10: 7

## 2025-07-22 ASSESSMENT — PAIN - FUNCTIONAL ASSESSMENT: PAIN_FUNCTIONAL_ASSESSMENT: 0-10

## 2025-07-22 ASSESSMENT — PAIN DESCRIPTION - ORIENTATION: ORIENTATION: LEFT

## 2025-07-22 ASSESSMENT — PAIN DESCRIPTION - LOCATION: LOCATION: NECK

## 2025-07-23 ENCOUNTER — HOSPITAL ENCOUNTER (EMERGENCY)
Facility: HOSPITAL | Age: 60
Discharge: HOME | End: 2025-07-23
Attending: EMERGENCY MEDICINE
Payer: COMMERCIAL

## 2025-07-23 ENCOUNTER — APPOINTMENT (OUTPATIENT)
Dept: RADIOLOGY | Facility: HOSPITAL | Age: 60
End: 2025-07-23
Payer: COMMERCIAL

## 2025-07-23 VITALS
BODY MASS INDEX: 30.8 KG/M2 | SYSTOLIC BLOOD PRESSURE: 178 MMHG | HEIGHT: 74 IN | OXYGEN SATURATION: 98 % | HEART RATE: 60 BPM | TEMPERATURE: 97.2 F | WEIGHT: 240 LBS | DIASTOLIC BLOOD PRESSURE: 82 MMHG | RESPIRATION RATE: 18 BRPM

## 2025-07-23 DIAGNOSIS — M54.2 NECK PAIN: Primary | ICD-10-CM

## 2025-07-23 PROCEDURE — 2500000005 HC RX 250 GENERAL PHARMACY W/O HCPCS: Performed by: EMERGENCY MEDICINE

## 2025-07-23 PROCEDURE — 96372 THER/PROPH/DIAG INJ SC/IM: CPT

## 2025-07-23 PROCEDURE — 72125 CT NECK SPINE W/O DYE: CPT

## 2025-07-23 PROCEDURE — 72125 CT NECK SPINE W/O DYE: CPT | Performed by: RADIOLOGY

## 2025-07-23 PROCEDURE — 2500000001 HC RX 250 WO HCPCS SELF ADMINISTERED DRUGS (ALT 637 FOR MEDICARE OP)

## 2025-07-23 PROCEDURE — 2500000004 HC RX 250 GENERAL PHARMACY W/ HCPCS (ALT 636 FOR OP/ED): Mod: JZ

## 2025-07-23 PROCEDURE — 2500000001 HC RX 250 WO HCPCS SELF ADMINISTERED DRUGS (ALT 637 FOR MEDICARE OP): Performed by: EMERGENCY MEDICINE

## 2025-07-23 RX ORDER — METHOCARBAMOL 500 MG/1
1000 TABLET, FILM COATED ORAL ONCE
Status: COMPLETED | OUTPATIENT
Start: 2025-07-23 | End: 2025-07-23

## 2025-07-23 RX ORDER — LIDOCAINE 560 MG/1
1 PATCH PERCUTANEOUS; TOPICAL; TRANSDERMAL ONCE
Status: DISCONTINUED | OUTPATIENT
Start: 2025-07-23 | End: 2025-07-23 | Stop reason: HOSPADM

## 2025-07-23 RX ORDER — METHOCARBAMOL 500 MG/1
500 TABLET, FILM COATED ORAL 2 TIMES DAILY
Qty: 20 TABLET | Refills: 0 | Status: SHIPPED | OUTPATIENT
Start: 2025-07-23 | End: 2025-08-02

## 2025-07-23 RX ORDER — LIDOCAINE 560 MG/1
1 PATCH PERCUTANEOUS; TOPICAL; TRANSDERMAL DAILY
Qty: 10 PATCH | Refills: 0 | Status: SHIPPED | OUTPATIENT
Start: 2025-07-23 | End: 2025-08-02

## 2025-07-23 RX ORDER — KETOROLAC TROMETHAMINE 30 MG/ML
30 INJECTION, SOLUTION INTRAMUSCULAR; INTRAVENOUS ONCE
Status: COMPLETED | OUTPATIENT
Start: 2025-07-23 | End: 2025-07-23

## 2025-07-23 RX ORDER — METHYLPREDNISOLONE 4 MG/1
TABLET ORAL
Qty: 21 TABLET | Refills: 0 | Status: SHIPPED | OUTPATIENT
Start: 2025-07-23 | End: 2025-07-29

## 2025-07-23 RX ORDER — OXYCODONE AND ACETAMINOPHEN 5; 325 MG/1; MG/1
1 TABLET ORAL ONCE
Refills: 0 | Status: COMPLETED | OUTPATIENT
Start: 2025-07-23 | End: 2025-07-23

## 2025-07-23 RX ADMIN — OXYCODONE HYDROCHLORIDE AND ACETAMINOPHEN 1 TABLET: 5; 325 TABLET ORAL at 02:07

## 2025-07-23 RX ADMIN — KETOROLAC TROMETHAMINE 30 MG: 30 INJECTION, SOLUTION INTRAMUSCULAR at 01:23

## 2025-07-23 RX ADMIN — LIDOCAINE 4% 1 PATCH: 40 PATCH TOPICAL at 02:07

## 2025-07-23 RX ADMIN — METHOCARBAMOL 1000 MG: 500 TABLET ORAL at 01:23

## 2025-07-23 ASSESSMENT — PAIN SCALES - GENERAL
PAINLEVEL_OUTOF10: 6
PAINLEVEL_OUTOF10: 8

## 2025-07-23 ASSESSMENT — PAIN - FUNCTIONAL ASSESSMENT: PAIN_FUNCTIONAL_ASSESSMENT: 0-10

## 2025-07-23 NOTE — DISCHARGE INSTRUCTIONS
"\"You were seen in the Emergency Department (ED) for neck pain. Your work-up and exam was unimpressive for critical or emergent cause at this time requiring admission.    Use the prescribed medication as written.  Take over-the-counter Tylenol and/or Motrin for additional pain control as needed.  Follow-up with your primary care physician and the orthopedic surgeon you are referred to within 48 hours regarding your ED visit.    Seek immediate medical attention should you have:  fevers of 38C (100.4 F) or higher, shortness of breath, chest pain, weakness, confusion, vomiting, or any worsening or concerning symptoms.\"  "

## 2025-07-23 NOTE — ED PROVIDER NOTES
Emergency Department Provider Note        History of Present Illness     History provided by: Patient  Limitations to History: None  External Records Reviewed with Brief Summary: None    HPI:  Wilfred Lubin is  60-year-old obese male with a history of coronary artery disease, hypertension, and prior myocardial infarction presents to the ED with a chief complaint of posterior neck pain, associated with right shoulder discomfort. The patient reports that symptoms began approximately 1.5 weeks ago upon waking from sleep, with persistent left posterolateral neck pain and right shoulder pain since onset. He rates the pain as 8/10 in severity. He took two Tylenol tablets earlier today with only minimal relief. He contacted his primary care provider last Thursday and was advised to go to the emergency department, but at the time he did not feel the pain warranted an ED visit. Due to persistent symptoms, he attempted to schedule an appointment with [facility] but was informed the next available appointment would be in one month. With no improvement and continued discomfort, he decided to seek evaluation in the ED. He denies any history of recent trauma, falls, headache, dizziness, lightheadedness, fever, cancer, cough, congestion, runny nose, dysphagia, chest pain, shortness of breath, nausea, or vomiting. He also denies smoking or illicit drug use.    Physical Exam   Triage vitals:  T 36.2 °C (97.2 °F)  HR 54  BP (!) 191/93  RR 18  O2 98 % None (Room air)    General: Awake, alert, in no acute distress  Eyes: Gaze conjugate.  No scleral icterus or injection  HENT: Normo-cephalic, atraumatic. No stridor.  Tenderness to palpation of the upper back T1 area.  CV: Regular rate, regular rhythm. Radial pulses 2+ bilaterally  Resp: Breathing non-labored, speaking in full sentences.  Clear to auscultation bilaterally  GI: Soft, non-distended, non-tender. No rebound or guarding.  : Not examined.  MSK/Extremities:  No gross bony deformities. Moving all extremities  Skin: Warm. Appropriate color  Neuro: Alert. Oriented. Face symmetric. Speech is fluent.  Gross strength and sensation intact in b/l UE and LEs  Psych: Appropriate mood and affect    Medical Decision Making & ED Course   Medical Decision Makin y.o. obese male with a history of CAD, HTN, and prior MI presenting with 1.5 weeks of persistent left posterior neck pain and right shoulder pain, without trauma or systemic symptoms. Differential diagnoses include cervical radiculopathy, musculoskeletal strain, degenerative disc disease, disc herniation, spondylosis, and less likely referred cardiac pain given his history of CAD and MI. I am less concerned about serious etiologies such as spinal epidural abscess, meningitis, or malignancy, as these are deemed unlikely given the absence of fever, neurologic deficits, recent trauma, or a history of cancer.    ED workup included a thorough neurologic and musculoskeletal examination, along with a CT scan of the neck, which showed no evidence of acute fracture or subluxation of the cervical spine. The patient was treated with multimodal pain management including 30 mg of IV Toradol, 1 g of IV Robaxin, a single dose of oral Percocet, and application of a lidocaine patch. He was observed following medical intervention; however, on reassessment, he reported no significant change in symptoms. The imaging results were reviewed with the patient, and he was reassured of the benign findings.     The case was discussed with the ED attending, Dr. Kennedy, who saw and evaluated patient at the bedside.  Given his stable vital signs, normal neurologic examination, and lack of acute imaging abnormalities, the patient was deemed appropriate for discharge. He was prescribed a short course of oral steroids, muscle relaxants, and lidocaine patches, with instructions to follow up with orthopedics or his primary care physician. Return  precautions were provided for any worsening pain, new neurologic deficits, or other concerning symptoms. The patient was discharged home in stable condition.  ----      Differential diagnoses considered include but are not limited to: Cervical radiculopathy, musculoskeletal strain, degenerative disc disease, or disc herniation, spondylosis.     Social Determinants of Health which Significantly Impact Care: None identified     EKG Independent Interpretation: EKG not obtained    Independent Result Review and Interpretation: Relevant laboratory and radiographic results were reviewed and independently interpreted by myself.  As necessary, they are commented on in the ED Course.    Chronic conditions affecting the patient's care: As documented above in MDM    The patient was discussed with the following consultants/services: None    Care Considerations: As documented above in [facility]    ED Course:  Diagnoses as of 07/24/25 0117   Neck pain     Disposition   As a result of the work-up, the patient was discharged home.  he was informed of his diagnosis and instructed to come back with any concerns or worsening of condition.  he and was agreeable to the plan as discussed above.  he was given the opportunity to ask questions.  All of the patient's questions were answered.    Procedures   Procedures    This was a shared visit with an ED attending.  The patient was seen and discussed with the ED attending Dr. Kennedy.    Zander Kennedy,   Emergency Medicine     Rambo Jane MD  Resident  07/23/25 1566      The patient was seen by the resident/fellow.  I have personally performed a substantive portion of the encounter.  I have seen and examined the patient; agree with the workup, evaluation, MDM, management and diagnosis.  The care plan has been discussed with the resident; I have reviewed the resident’s note and agree with the documented findings.      Patient has a flat back, with scoliosis.  On examination,  trapezius muscle on the right side appears inflamed, and boggy compared to the left side.  The pain starts midline, and goes over towards the left shoulder posteriorly along the scapula.  It is 100% reproducible with palpation, worse with neck turning, worse with arm lifting.  Feels slightly better with pressure and manipulation.  Notified patient this most likely is musculoskeletal, and he is to utilize the Lidoderm patch, Motrin Tylenol, and pain medications and muscle relaxers as directed.  He is to return to the emergency room otherwise for any anterior chest pain, shortness of breath, nausea and sweats, or any worsening concerning symptoms.                                                                         Zander Kennedy,   07/24/25 0118

## 2025-07-24 ENCOUNTER — OFFICE VISIT (OUTPATIENT)
Dept: ORTHOPEDIC SURGERY | Facility: CLINIC | Age: 60
End: 2025-07-24
Payer: COMMERCIAL

## 2025-07-24 DIAGNOSIS — M62.838 CERVICAL PARASPINAL MUSCLE SPASM: ICD-10-CM

## 2025-07-24 DIAGNOSIS — M50.30 DEGENERATIVE DISC DISEASE, CERVICAL: ICD-10-CM

## 2025-07-24 DIAGNOSIS — M54.2 NECK PAIN: Primary | ICD-10-CM

## 2025-07-24 DIAGNOSIS — M54.12 CERVICAL RADICULOPATHY: ICD-10-CM

## 2025-07-24 PROCEDURE — 1036F TOBACCO NON-USER: CPT

## 2025-07-24 PROCEDURE — 99204 OFFICE O/P NEW MOD 45 MIN: CPT

## 2025-07-24 RX ORDER — PREDNISONE 10 MG/1
TABLET ORAL
Qty: 43 TABLET | Refills: 0 | Status: SHIPPED | OUTPATIENT
Start: 2025-07-24 | End: 2025-08-03

## 2025-07-24 RX ORDER — CYCLOBENZAPRINE HCL 10 MG
10 TABLET ORAL 3 TIMES DAILY PRN
Qty: 30 TABLET | Refills: 1 | Status: SHIPPED | OUTPATIENT
Start: 2025-07-24 | End: 2025-08-13

## 2025-07-24 NOTE — PROGRESS NOTES
HPI:  Wilfred Lubin is a 60 y.o. year-old male who presents to the office with a 2-week history of right arm pain/numbness and tingling.  He also has some neck pain and paraspinal muscle spasms.  This started randomly.  His symptoms are worse at night.  He states that his right arm symptoms go all the way down his arm into all 5 fingers.  Denies symptoms on the left arm.  Right  has been weakening.  No issues with balance or coordination.  No other symptoms of myelopathy.    ROS:  Reviewed on EMR and patient intake sheet.    PMH/SH:  Reviewed on EMR and patient intake sheet.    Exam:  MSK: Full strength range of motion of upper extremities bilaterally.  Negative Ken's, negative Spurling's.  General: No acute distress. Awake and conversant.  Eyes: Normal conjunctiva, anicteric. Round symmetric pupils.  ENT: Hearing grossly intact. No nasal discharge.  Neck: Neck is supple. No masses or thyromegaly.  Respiratory: Respirations are non-labored. No wheezing.  Skin: Warm. No rashes or ulcers.  Psych: Alert and oriented. Cooperative, appropriate mood and affect, normal judgement.  CV: No lower extremity edema.  Neuro: Sensation and CN II-XII grossly normal.    Radiology:     CT scan of cervical spine personally reviewed and demonstrates mild disc degeneration and osteophytosis of posterior vertebral column.  No acute fractures or dislocations.    Diagnosis:    Cervical radiculopathy  Degenerative disc disease, cervical    Assessment and Plan:  Patient was seen and evaluated for a 2-week history of random onset cervical radicular symptoms and cervical muscle spasms.  I prescribed a prednisone taper, cyclobenzaprine, and placed an order for physical therapy.  He was recommended to follow-up if his symptoms persist despite conservative management.  Next step would be an MRI.  Patient feels all questions were answered today.  Patient agrees to the plan above.    **This note was dictated using speech recognition  software and was not corrected for spelling or grammatical errors**    Bertrand Loya PA-C  Department of Orthopaedic Surgery  12:15 PM  07/24/25    [id number] [place] [address]    Voicemail: [phone]   Appts: [phone]  Fax: [phone]

## (undated) DEVICE — PAD, GROUNDING, ELECTROSURGICAL, W/9 FT CABLE, POLYHESIVE II, ADULT, LF

## (undated) DEVICE — MANIFOLD, 4 PORT NEPTUNE STANDARD

## (undated) DEVICE — Device

## (undated) DEVICE — DRAPE, C-ARMOR KIT

## (undated) DEVICE — CATHETER, IV, ANGIOCATH, 14 G X 1.88 IN, FEP POLYMER

## (undated) DEVICE — HEMOSTAT, SURGICEL POWDER 3.0GRAMS

## (undated) DEVICE — TUBING, SMOKE EVAC, 3/8 X 10 FT

## (undated) DEVICE — DRAPE, TOWEL, STERI DRAPE, 17 X 11 IN, PLASTIC, STERILE

## (undated) DEVICE — DRAPE, MICROSCOPE, FOR ZEISS 65MM, VARI-LENS II, 52 X 150

## (undated) DEVICE — DRESSING, MEPILEX, POST OP, 8 X 4

## (undated) DEVICE — CLOSURE SYSTEM, DERMABOND, PRINEO, 22CM, STERILE

## (undated) DEVICE — SYRINGE, 20 CC, LUER LOCK, MONOJECT, W/O CAP, LF

## (undated) DEVICE — COVER, CART, 45 X 27 X 48 IN, CLEAR

## (undated) DEVICE — DRAPE, C-ARM IMAGE

## (undated) DEVICE — TAPE, SILK, DURAPORE, 3 IN X 10 YD, LF

## (undated) DEVICE — MARKER, SKIN, RULER AND LABEL PACK, CUSTOM

## (undated) DEVICE — DRESSING, MEPILEX, BORDER FLEX, 6 X 8

## (undated) DEVICE — ADHESIVE, SKIN, DERMABOND ADVANCED, 15CM, PEN-STYLE

## (undated) DEVICE — SEALANT, HEMOSTATIC, FLOSEAL, 10 ML

## (undated) DEVICE — APPLICATOR, CHLORAPREP, W/ORANGE TINT, 26ML

## (undated) DEVICE — BONE VAC, AUTOLOGOUS DUST COLLECTOR

## (undated) DEVICE — BUR, 3MM X 3.8MM, PRECISION, NEURO DRILL

## (undated) DEVICE — SPHERE, STEALTHSTATION, 5-PK

## (undated) DEVICE — DRESSING, MEPILEX, BORDER FLEX, 3 X 3

## (undated) DEVICE — TUBING, MIDAS MR8 IRRIGATION TUBING, CLEARVIEW

## (undated) DEVICE — KIT, PATIENT CARE, JACKSON TABLE W/PRONE-SAFE HEADREST

## (undated) DEVICE — INSTRUMENT, CONICAL TIP, INSTAFILL, DISP

## (undated) DEVICE — BUR, MR8 14CM 3MM DIA, MATCH HEAD, LOW PROFILE SP SYM-TRI

## (undated) DEVICE — SUTURE, VICRYL, 0, 18 IN, CT-1, UNDYED

## (undated) DEVICE — SPONGE, HEMOSTATIC, GELATIN, SURGIFOAM, 8 X 12.5 CM X 10 MM